# Patient Record
Sex: MALE | Race: WHITE | Employment: OTHER | URBAN - METROPOLITAN AREA
[De-identification: names, ages, dates, MRNs, and addresses within clinical notes are randomized per-mention and may not be internally consistent; named-entity substitution may affect disease eponyms.]

---

## 2017-03-13 RX ORDER — GABAPENTIN 300 MG/1
CAPSULE ORAL
Qty: 120 CAP | Refills: 0 | Status: SHIPPED | OUTPATIENT
Start: 2017-03-13 | End: 2018-01-29 | Stop reason: SDUPTHER

## 2017-03-13 NOTE — TELEPHONE ENCOUNTER
Last Visit: 09/12/2016 with MARISSA Reynoso    Next Appointment: 04/07/2017 with MD Mariah Mejia   Previous Refill Encounters: 12/30/2016 per NP Varsha Fair #[phone]     Requested Prescriptions     Pending Prescriptions Disp Refills    gabapentin (NEURONTIN) 300 mg capsule 120 Cap 0     Sig: Take 2 tabs in the am, 1 tab at noon, 1 tab at bedtime.

## 2017-04-07 ENCOUNTER — OFFICE VISIT (OUTPATIENT)
Dept: ORTHOPEDIC SURGERY | Age: 77
End: 2017-04-07

## 2017-04-07 VITALS
DIASTOLIC BLOOD PRESSURE: 64 MMHG | HEIGHT: 68 IN | RESPIRATION RATE: 20 BRPM | HEART RATE: 68 BPM | BODY MASS INDEX: 34.89 KG/M2 | WEIGHT: 230.2 LBS | SYSTOLIC BLOOD PRESSURE: 167 MMHG

## 2017-04-07 DIAGNOSIS — M54.16 LUMBAR NEURITIS: Primary | ICD-10-CM

## 2017-04-07 DIAGNOSIS — M51.36 DDD (DEGENERATIVE DISC DISEASE), LUMBAR: ICD-10-CM

## 2017-04-07 DIAGNOSIS — M43.07 LUMBOSACRAL SPONDYLOLYSIS: ICD-10-CM

## 2017-04-07 DIAGNOSIS — M43.10 SPONDYLOLISTHESIS, ACQUIRED: ICD-10-CM

## 2017-04-07 RX ORDER — GABAPENTIN 600 MG/1
TABLET ORAL
Qty: 270 TAB | Refills: 0 | Status: SHIPPED | OUTPATIENT
Start: 2017-04-07 | End: 2017-04-17 | Stop reason: SDUPTHER

## 2017-04-07 RX ORDER — ALBUTEROL SULFATE 90 UG/1
AEROSOL, METERED RESPIRATORY (INHALATION)
Refills: 11 | COMMUNITY
Start: 2017-03-21 | End: 2022-01-01

## 2017-04-07 RX ORDER — GABAPENTIN 600 MG/1
600 TABLET ORAL 3 TIMES DAILY
Qty: 90 TAB | Refills: 1 | Status: SHIPPED | OUTPATIENT
Start: 2017-04-07 | End: 2017-04-07 | Stop reason: SDUPTHER

## 2017-04-07 RX ORDER — BUDESONIDE AND FORMOTEROL FUMARATE DIHYDRATE 160; 4.5 UG/1; UG/1
AEROSOL RESPIRATORY (INHALATION)
Refills: 3 | COMMUNITY
Start: 2017-03-22 | End: 2021-01-01 | Stop reason: SDUPTHER

## 2017-04-07 RX ORDER — TRAZODONE HYDROCHLORIDE 100 MG/1
TABLET ORAL
Refills: 0 | COMMUNITY
Start: 2017-03-22 | End: 2021-07-22

## 2017-04-07 NOTE — MR AVS SNAPSHOT
Visit Information Date & Time Provider Department Dept. Phone Encounter #  
 4/7/2017  9:50 AM Wolf Perales  [facility], [address] [facility] [place] [phone] [id number] Follow-up Instructions Return in about 4 weeks (around 5/5/2017). Upcoming Health Maintenance Date Due  
 FOOT EXAM Q1 3/28/1950 MICROALBUMIN Q1 3/28/1950 EYE EXAM RETINAL OR DILATED Q1 3/28/1952 DTaP/Tdap/Td series (1 - Tdap) 3/28/1963 ZOSTER VACCINE AGE 60> 3/28/2002 GLAUCOMA SCREENING Q2Y 3/28/2007 MEDICARE YEARLY EXAM 3/28/2007 LIPID PANEL Q1 7/20/2011 HEMOGLOBIN A1C Q6M 6/16/2012 Pneumococcal 65+ Low/Medium Risk (2 of 2 - PPSV23) 7/24/2014 INFLUENZA AGE 9 TO ADULT 8/1/2016 COLONOSCOPY 1/1/2019 Allergies as of 4/7/2017  Review Complete On: 4/7/2017 By: Wolf Perales MD  
 No Known Allergies Current Immunizations  Never Reviewed Name Date Influenza Vaccine Whole 12/16/2011 Pneumococcal Vaccine (Unspecified Type) 7/24/2009 Not reviewed this visit You Were Diagnosed With   
  
 Codes Comments Lumbar neuritis    -  Primary ICD-10-CM: M54.16 
ICD-9-CM: 724.4 DDD (degenerative disc disease), lumbar     ICD-10-CM: M51.36 
ICD-9-CM: 722.52 Spondylolisthesis, acquired     ICD-10-CM: M43.10 ICD-9-CM: 738.4 Lumbosacral spondylolysis     ICD-10-CM: M43.07 
ICD-9-CM: 738.4 Vitals BP Pulse Resp Height(growth percentile) Weight(growth percentile) BMI  
 167/64 (BP 1 Location: Left arm, BP Patient Position: Sitting) 68 20 5' 8\" (1.727 m) 230 lb 3.2 oz (104.4 kg) 35 kg/m2 Smoking Status Current Every Day Smoker Vitals History BMI and BSA Data Body Mass Index Body Surface Area 35 kg/m 2 2.24 m 2 Preferred Pharmacy Pharmacy Name Phone 417 [facility], [address] [place] [address] [phone] Your Updated Medication List  
  
   
This list is accurate as of: 4/7/17 10:38 AM.  Always use your most recent med list.  
  
  
  
  
 aspirin 325 mg tablet Commonly known as:  ASPIRIN Take 81 mg by mouth daily. cetirizine 10 mg tablet Commonly known as:  ZYRTEC  
  
 esomeprazole 40 mg capsule Commonly known as:  NEXIUM  
  
 * gabapentin 300 mg capsule Commonly known as:  NEURONTIN Take 2 tabs in the am, 1 tab at noon, 1 tab at bedtime. * gabapentin 600 mg tablet Commonly known as:  NEURONTIN Take 1 Tab by mouth three (3) times daily. glipiZIDE SR 5 mg CR tablet Commonly known as:  GLUCOTROL XL  
  
 insulin lispro 100 unit/mL injection Commonly known as:  HUMALOG  
[id number] (RxNorm) LASIX 20 mg tablet Generic drug:  furosemide Take  by mouth daily. lisinopril 10 mg tablet Commonly known as:  Dorette Gabriel Take  by mouth daily. losartan 100 mg tablet Commonly known as:  COZAAR Take 50 mg by mouth daily. pioglitazone 15 mg tablet Commonly known as:  ACTOS  
  
 pravastatin 40 mg tablet Commonly known as:  PRAVACHOL 40 mg. SPIRIVA WITH HANDIHALER 18 mcg inhalation capsule Generic drug:  tiotropium SYMBICORT 160-4.5 mcg/actuation HFA inhaler Generic drug:  budesonide-formoterol INL 2 PFS PO TWICE DAILY. RINSE MOUTH AFTER U  
  
 traZODone 100 mg tablet Commonly known as:  DESYREL  
TK 1 T PO QD HS  
  
 VENTOLIN HFA 90 mcg/actuation inhaler Generic drug:  albuterol INL 1 TO 2 PFS PO Q 4 H FOR 6 H PRF SOB OR WHZ OR COUGH  
  
 VIAGRA 100 mg tablet Generic drug:  sildenafil citrate Take 100 mg by mouth as needed. VIRT-KAILYN 2.5-25-1 mg tablet Generic drug:  folic acid-vit T7-QVT B69 * Notice: This list has 2 medication(s) that are the same as other medications prescribed for you. Read the directions carefully, and ask your doctor or other care provider to review them with you. Prescriptions Sent to Pharmacy Refills  
 gabapentin (NEURONTIN) 600 mg tablet 1 Sig: Take 1 Tab by mouth three (3) times daily. Class: Normal  
 Pharmacy: [facility] [address] [facility] #: [phone] Route: Oral  
  
Follow-up Instructions Return in about 4 weeks (around 5/5/2017). Introducing [facility]! Teresa Mcneal introduces SchoolOut patient portal. Now you can access parts of your medical record, email your doctor's office, and request medication refills online. 1. In your internet browser, go to [email or web address] 2. Click on the First Time User? Click Here link in the Sign In box. You will see the New Member Sign Up page. 3. Enter your SchoolOut Access Code exactly as it appears below. You will not need to use this code after youve completed the sign-up process. If you do not sign up before the expiration date, you must request a new code. · SchoolOut Access Code: Maximiliano Solo Expires: 7/6/2017  9:41 AM 
 
4. Enter the last four digits of your Social Security Number (xxxx) and Date of Birth (mm/dd/yyyy) as indicated and click Submit. You will be taken to the next sign-up page. 5. Create a SchoolOut ID. This will be your SchoolOut login ID and cannot be changed, so think of one that is secure and easy to remember. 6. Create a SchoolOut password. You can change your password at any time. 7. Enter your Password Reset Question and Answer. This can be used at a later time if you forget your password. 8. Enter your e-mail address. You will receive e-mail notification when new information is available in [address] 9. Click Sign Up. You can now view and download portions of your medical record. 10. Click the Download Summary menu link to download a portable copy of your medical information.  
 
If you have questions, please visit the Frequently Asked Questions section of the Spark[employer]. Remember, [employer] is NOT to be used for urgent needs. For medical emergencies, dial 911. Now available from your iPhone and Android! Please provide this summary of care documentation to your next provider. Your primary care clinician is listed as [facility]. If you have any questions after today's visit, please call [phone].

## 2017-04-07 NOTE — PROGRESS NOTES
[facility]  [address]  [address], [address]   Phone: [phone]  Fax: [phone]        PROGRESS NOTE      HISTORY OF PRESENT ILLNESS:  The patient is a 78 y.o. male and was seen today for follow up of low back pain into posterior BLE to the knee, with equal severity in LE. He describes symptoms consistent with stenosis. Notes from Dr. Jeremy Palmer dated 5/3/16 indicates he has joint pain as well as back pain that is not radiating. According to the note, he has increased pain with walking. He has been treated with muscle relaxants, Tramadol, and nonsteroidal antiinflammatories without relief. Patient reports he went to therapy without relief. He continues with his HEP daily. He denies previous blocks or surgery. He states Medrol Dosepak provided no relief. He reports following Medrol Dosepak, blood sugars jim slightly above 200. He tolerates Neurontin 100 mg with slight relief. Patient reports his renal function is doing well. The patient has a history of DM and reports blood sugars are well controlled, consistently remaining around 150. She is followed by Leopoldo Sands NP for his DM. Patient denies change in bowel or bladder habits. Preliminary reading of lumbar plain films revealed limited visibility of L5-S1. Otherwise age-appropriate degenerative changes. No acute pathology identified. Lumbar spine MRI films and report dated 06/14/16 were reviewed. Per report, multilevel degenerative disc and facet joint disease. No high-grade central canal stenosis. Bilateral L5 pars defect and grade 1 anterolisthesis of L5/S1. Mild discogenic bone marrow edema signal at L2/3 suggesting inflammation. Multilevel mild to moderate degrees of foraminal stenosis as discussed. Incidentally noted aortic atherosclerosis and focal fusiform 3.2 cm aneurysmal dilation of the distal aorta.  At his last clinical appointment, I had patient follow up with Leopoldo Sands NP for the aortic aneurysms noted on lumbar spine MRI. I increased his Neurontin to 300 mg TID. Patient should continue with his HEP daily. The patient returns today with pain location and distribution remain unchanged. He rates pain 2-9/10, elevated since her last visit (5/10). Note from Samara Daley NP dated 9/12/16 indicating patient was seen for f/u of low back pain which does radiate. Pt is being monitored by his PCP for his aortic aneurysms. Pt is completing his HEP 2x/day. He is currently taking Neurontin 600 mg in the AM, 300 mg in the afternoon and 300 mg qhs with mild relief.  reviewed. 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 Social History Main Topics    Smoking status: Current Every Day Smoker     Packs/day: 1.00     Years: 56.00    Smokeless tobacco: Never Used      Comment: he smokes a few per day, although he used to smoke one pack a day regularly    Alcohol use No    Drug use: Not on file    Sexual activity: Not on file     Other Topics Concern    Not on file     Social History Narrative       Current Outpatient Prescriptions   Medication Sig Dispense Refill    traZODone (DESYREL) 100 mg tablet TK 1 T PO QD HS  0    SYMBICORT 160-4.5 mcg/actuation HFA inhaler INL 2 PFS PO TWICE DAILY. RINSE MOUTH AFTER U  3    VENTOLIN HFA 90 mcg/actuation inhaler INL 1 TO 2 PFS PO Q 4 H FOR 6 H PRF SOB OR WHZ OR COUGH  11    gabapentin (NEURONTIN) 600 mg tablet Take 1 Tab by mouth three (3) times daily.  90 Tab 1    gabapentin (NEURONTIN) 300 mg capsule Take 2 tabs in the am, 1 tab at noon, 1 tab at bedtime. 120 Cap 0    SPIRIVA WITH HANDIHALER 18 mcg inhalation capsule       VIRT-KAILYN 2.5-25-1 mg tablet       pravastatin (PRAVACHOL) 40 mg tablet 40 mg.      glipiZIDE SR (GLUCOTROL) 5 mg CR tablet   0    esomeprazole (NEXIUM) 40 mg capsule   6    pioglitazone (ACTOS) 15 mg tablet   0    losartan (COZAAR) 100 mg tablet Take 50 mg by mouth daily.  furosemide (LASIX) 20 mg tablet Take  by mouth daily.  lisinopril (PRINIVIL, ZESTRIL) 10 mg tablet Take  by mouth daily.  sildenafil citrate (VIAGRA) 100 mg tablet Take 100 mg by mouth as needed.  aspirin (ASPIRIN) 325 mg tablet Take 81 mg by mouth daily.  insulin lispro (HUMALOG) 100 unit/mL injection [id number] (RxNorm)      cetirizine (ZYRTEC) 10 mg tablet   1       No Known Allergies       PHYSICAL EXAMINATION    Visit Vitals    /64 (BP 1 Location: Left arm, BP Patient Position: Sitting)  Comment: pt asymptomatic, just took bp meds    Pulse 68    Resp 20    Ht 5' 8\" (1.727 m)    Wt 230 lb 3.2 oz (104.4 kg)    BMI 35 kg/m2       CONSTITUTIONAL: NAD, A&O x 3  SENSATION: Intact to light touch throughout  RANGE OF MOTION: The patient has full passive range of motion in all four extremities. MOTOR:  Straight Leg Raise: Negative, bilateral               Hip Flex Knee Ext Knee Flex Ankle DF GTE Ankle PF Tone   Right +4/5 +4/5 +4/5 +4/5 +4/5 +4/5 +4/5   Left +4/5 +4/5 +4/5 +4/5 +4/5 +4/5 +4/5       ASSESSMENT   Brit Galvan was seen today for back pain and leg pain. Diagnoses and all orders for this visit:    Lumbar neuritis    DDD (degenerative disc disease), lumbar    Spondylolisthesis, acquired    Lumbosacral spondylolysis    Other orders  -     gabapentin (NEURONTIN) 600 mg tablet; Take 1 Tab by mouth three (3) times daily. IMPRESSION AND PLAN:   I will increase his Neurontin to 600 mg TID. Patient advised to call the office if intolerant to higher dose. The patient would like to defer lumbar blocks at this time. I encourage patient to perform his HEP daily. I will see the patient back in 1 month's time or earlier if needed. Written by Jhoana Hensley, as dictated by Charlie Humphries MD  I examined the patient, reviewed and agree with the note.

## 2017-04-17 RX ORDER — GABAPENTIN 600 MG/1
600 TABLET ORAL 3 TIMES DAILY
Qty: 270 TAB | Refills: 0 | Status: SHIPPED | OUTPATIENT
Start: 2017-04-17 | End: 2017-08-23 | Stop reason: SDUPTHER

## 2017-04-17 RX ORDER — GABAPENTIN 300 MG/1
CAPSULE ORAL
Qty: 360 CAP | Refills: 0 | OUTPATIENT
Start: 2017-04-17

## 2017-04-17 NOTE — TELEPHONE ENCOUNTER
IMPRESSION AND PLAN:  I will increase his Neurontin to 600 mg TID. Patient advised to call the office if intolerant to higher dose. .. I will see the patient back in 1 month's time or earlier if needed. Dr. Juan Sousa prescribed Neurontin RX with dosage increase during the LOV. The prescription has been received and filled by the pharmacy for a 30 day supply, he has 1 refill remaining. The pharmacy did not receive the prescription for a 90 day supply. Pending order for 90 day supply to resend to pharmacy.

## 2017-06-21 ENCOUNTER — OFFICE VISIT (OUTPATIENT)
Dept: ORTHOPEDIC SURGERY | Age: 77
End: 2017-06-21

## 2017-06-21 VITALS
WEIGHT: 222 LBS | DIASTOLIC BLOOD PRESSURE: 55 MMHG | RESPIRATION RATE: 18 BRPM | HEIGHT: 68 IN | HEART RATE: 60 BPM | BODY MASS INDEX: 33.65 KG/M2 | SYSTOLIC BLOOD PRESSURE: 151 MMHG

## 2017-06-21 DIAGNOSIS — M51.36 DDD (DEGENERATIVE DISC DISEASE), LUMBAR: ICD-10-CM

## 2017-06-21 DIAGNOSIS — M47.26 OSTEOARTHRITIS OF SPINE WITH RADICULOPATHY, LUMBAR REGION: Primary | ICD-10-CM

## 2017-06-21 NOTE — PATIENT INSTRUCTIONS
Back Pain: Care Instructions  Your Care Instructions    Back pain has many possible causes. It is often related to problems with muscles and ligaments of the back. It may also be related to problems with the nerves, discs, or bones of the back. Moving, lifting, standing, sitting, or sleeping in an awkward way can strain the back. Sometimes you don't notice the injury until later. Arthritis is another common cause of back pain. Although it may hurt a lot, back pain usually improves on its own within several weeks. Most people recover in 12 weeks or less. Using good home treatment and being careful not to stress your back can help you feel better sooner. Follow-up care is a key part of your treatment and safety. Be sure to make and go to all appointments, and call your doctor if you are having problems. Its also a good idea to know your test results and keep a list of the medicines you take. How can you care for yourself at home? · Sit or lie in positions that are most comfortable and reduce your pain. Try one of these positions when you lie down:  ¨ Lie on your back with your knees bent and supported by large pillows. ¨ Lie on the floor with your legs on the seat of a sofa or chair. Gerardo Lipschutz on your side with your knees and hips bent and a pillow between your legs. ¨ Lie on your stomach if it does not make pain worse. · Do not sit up in bed, and avoid soft couches and twisted positions. Bed rest can help relieve pain at first, but it delays healing. Avoid bed rest after the first day of back pain. · Change positions every 30 minutes. If you must sit for long periods of time, take breaks from sitting. Get up and walk around, or lie in a comfortable position. · Try using a heating pad on a low or medium setting for 15 to 20 minutes every 2 or 3 hours. Try a warm shower in place of one session with the heating pad. · You can also try an ice pack for 10 to 15 minutes every 2 to 3 hours.  Put a thin cloth between the ice pack and your skin. · Take pain medicines exactly as directed. ¨ If the doctor gave you a prescription medicine for pain, take it as prescribed. ¨ If you are not taking a prescription pain medicine, ask your doctor if you can take an over-the-counter medicine. · Take short walks several times a day. You can start with 5 to 10 minutes, 3 or 4 times a day, and work up to longer walks. Walk on level surfaces and avoid hills and stairs until your back is better. · Return to work and other activities as soon as you can. Continued rest without activity is usually not good for your back. · To prevent future back pain, do exercises to stretch and strengthen your back and stomach. Learn how to use good posture, safe lifting techniques, and proper body mechanics. When should you call for help? Call your doctor now or seek immediate medical care if:  · You have new or worsening numbness in your legs. · You have new or worsening weakness in your legs. (This could make it hard to stand up.)  · You lose control of your bladder or bowels. Watch closely for changes in your health, and be sure to contact your doctor if:  · Your pain gets worse. · You are not getting better after 2 weeks. Where can you learn more? Go to http://marilu-soren.info/. Enter I759 in the search box to learn more about \"Back Pain: Care Instructions. \"  Current as of: March 21, 2017  Content Version: 11.3  © 7632-2718 [employer]. Care instructions adapted under license by [employer] (which disclaims liability or warranty for this information). If you have questions about a medical condition or this instruction, always ask your healthcare professional. [address] any warranty or liability for your use of this information.

## 2017-06-21 NOTE — PROGRESS NOTES
Chief complaint/History of Present Illness:  Chief Complaint   Patient presents with    Back Pain     Follow up     Leg Pain     BLE     MARY Carlos is a  78 y.o.  male      HISTORY OF PRESENT ILLNESS:  The patient comes in today for follow-up of his low back pain with bilateral lower extremity pain. Neurontin 600 mg three times per day is helping his leg pain, but he is still having back pain. The back pain seems to be worse. He rates it as 7/10. He no longer takes Tramadol through his PCP because he is on Trazodone. They do not want him to take it together. His blood sugar was 118 when he checked it this morning. He denies fever, bowel, or bladder dysfunction. PHYSICAL EXAM:  Mr. Senait Dougherty  is a 81-year-old male. He is alert and oriented and has a full weightbearing and non-antalgic gait. He has pain with hyperextension of the lumbar spine. His pain seems to be more in the upper lumbar, L3-L4 and L4-L5 than where his anterolisthesis is at L5-S1. He has 4/5 strength in bilateral lower extremities. Negative straight leg raise. He has pain with hyperextension of the lumbar spine. ASSESSENT/PLAN:  This is a patient with lumbar spondylosis, facet hypertrophy at L3-L4 and L4-L5. We are going to set him up for bilateral L3-L4 and L4-L5 facet blocks. He has enough Neurontin. We will see him back after the block.          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 Cecil Glass at [facility]      s/p right knee     Social History     Social History    Marital status:      Spouse name: N/A    Number of children: N/A    Years of education: N/A     Occupational History    Not on file. Social History Main Topics    Smoking status: Current Every Day Smoker     Packs/day: 1.00     Years: 56.00    Smokeless tobacco: Never Used      Comment: he smokes a few per day, although he used to smoke one pack a day regularly    Alcohol use No    Drug use: Not on file    Sexual activity: Not on file     Other Topics Concern    Not on file     Social History Narrative     Family History   Problem Relation Age of Onset    Diabetes Sister     No Known Problems Brother     No Known Problems Brother     Diabetes Sister        Physical Exam:  Visit Vitals    /55    Pulse 60    Resp 18    Ht 5' 8\" (1.727 m)    Wt 222 lb (100.7 kg)    BMI 33.75 kg/m2     Pain Scale: 7/10     has been . reviewed and is appropriate        Jonas Cervantes was seen today for back pain and leg pain. Diagnoses and all orders for this visit:    Osteoarthritis of spine with radiculopathy, lumbar region  -     SCHEDULE SURGERY    DDD (degenerative disc disease), lumbar            Follow-up Disposition:  Return for after block.         We have informed Iris Dalal to notify us for immediate appointment if he has any worsening neurogical symptoms or if an emergency situation presents, then call [phone]

## 2017-08-23 ENCOUNTER — TELEPHONE (OUTPATIENT)
Dept: ORTHOPEDIC SURGERY | Age: 77
End: 2017-08-23

## 2017-08-23 RX ORDER — GABAPENTIN 600 MG/1
600 TABLET ORAL 3 TIMES DAILY
Qty: 90 TAB | Refills: 0 | Status: SHIPPED | OUTPATIENT
Start: 2017-08-23 | End: 2017-08-23 | Stop reason: SDUPTHER

## 2017-08-23 RX ORDER — GABAPENTIN 600 MG/1
TABLET ORAL
Qty: 270 TAB | Refills: 0 | Status: SHIPPED | OUTPATIENT
Start: 2017-08-23 | End: 2021-07-22

## 2017-08-23 NOTE — TELEPHONE ENCOUNTER
Pt called in for a refill on Gabapentin. Pt has a follow up appointment on 9/13 with Dr. Eusebio Kearney but is out of medication. Pt can be reached at [phone].

## 2017-08-23 NOTE — TELEPHONE ENCOUNTER
One month rx given.  Please explain one month was given to get him to his FU in September at which time we can send a 90 day supply

## 2017-08-23 NOTE — TELEPHONE ENCOUNTER
Called and informed patient that per the provider we have sent a 30 day supply of his medication to get him to his follow up appointment and then he will be on track. Patient then states he would like a motorized scooter and it wanted to make sure we have the information when he gets here. I informed patient I will discuss this with the provider and what information we find we could give him at his next appointment. Patient verbalized understanding. Please advise about the motorized scooter.

## 2017-08-24 NOTE — TELEPHONE ENCOUNTER
This would be a decision Dr. Britt Van makes. In a recent meeting with Thana Kanner, we discussed trying not to prescribe motorized scooters as they decrease the patients mobility.

## 2018-01-29 ENCOUNTER — OFFICE VISIT (OUTPATIENT)
Dept: ORTHOPEDIC SURGERY | Age: 78
End: 2018-01-29

## 2018-01-29 VITALS
HEART RATE: 62 BPM | SYSTOLIC BLOOD PRESSURE: 128 MMHG | WEIGHT: 208 LBS | BODY MASS INDEX: 31.52 KG/M2 | DIASTOLIC BLOOD PRESSURE: 48 MMHG | HEIGHT: 68 IN

## 2018-01-29 DIAGNOSIS — M47.16 LUMBAR SPONDYLOSIS WITH MYELOPATHY: Primary | ICD-10-CM

## 2018-01-29 RX ORDER — GABAPENTIN 600 MG/1
600 TABLET ORAL 3 TIMES DAILY
Qty: 90 TAB | Refills: 0 | Status: SHIPPED | OUTPATIENT
Start: 2018-01-29 | End: 2018-06-01

## 2018-01-29 NOTE — PROGRESS NOTES
[facility]  [address]  [address], [address]   Phone: [phone]  Fax: [phone]        PROGRESS NOTE      HISTORY OF PRESENT ILLNESS:  The patient is a 78 y.o. male and was seen today for follow up of low back pain into posterior BLE to the knee, with equal severity in LE. He describes symptoms consistent with stenosis. Notes from Dr. Romelia Ordaz dated 5/3/16 indicates he has joint pain as well as back pain that is not radiating. According to the note, he has increased pain with walking. He has been treated with muscle relaxants, Tramadol, and nonsteroidal antiinflammatories without relief. Note from Kamille Hernandez NP dated 9/12/16 indicating patient was seen for f/u of low back pain which does radiate. Patient reports he went to therapy without relief. He continues with his HEP daily. He denies previous blocks or surgery. He states Medrol Dosepak provided no relief. He reports following Medrol Dosepak, blood sugars jim slightly above 200. He tolerates Neurontin 100 mg with slight relief. Patient reports his renal function is doing well. Pt is being monitored by his PCP for his aortic aneurysms. The patient has a history of DM and reports blood sugars are well controlled, consistently remaining around 150. She is followed by Bessie Damian NP for his DM. Patient denies change in bowel or bladder habits. Preliminary reading of lumbar plain films revealed limited visibility of L5-S1. Otherwise age-appropriate degenerative changes. No acute pathology identified. Lumbar spine MRI films and report dated 06/14/16 were reviewed. Per report, multilevel degenerative disc and facet joint disease. No high-grade central canal stenosis. Bilateral L5 pars defect and grade 1 anterolisthesis of L5/S1. Mild discogenic bone marrow edema signal at L2/3 suggesting inflammation. Multilevel mild to moderate degrees of foraminal stenosis as discussed.  Incidentally noted aortic atherosclerosis and focal fusiform 3.2 cm aneurysmal dilation of the distal aorta. At his last clinical appointment, I increased his Neurontin to 600 mg TID. The patient would like to defer lumbar blocks at this time. I encourage patient to perform his HEP daily. Last seen by me on 4/7/17 for low back pain extending into the BLE. Symptoms consistent for stenosis. At that time  I increased his Neurontin to 600 mg TID. He was supposed to f/u within one month's time but he failed to do so. He subsequently saw Summer Malave on 6/21/17. Note reviewed. It was noted; patient presented with an improvement in his extremity pain but continued to experience low back pain despite the increased of Neurontin. He rates 7/10. She set him up for a bilateral L3-L4 and L4-L5 facet blocks, which he received on 6/27/17 with 3-4 month benefit. Patient failed to follow up after block. The patient returns today with pain location and distribution remain unchanged. He rates pain 5/10, consistent with his last visit (2-9/10). Patient run out of Neurontin 600 mg TID last week as he was really taking it once a day without any increase in pain.  reviewed. Body mass index is 31.63 kg/(m^2). 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      Social History Main Topics    Smoking status: Current Every Day Smoker     Packs/day: 1.00     Years: 56.00    Smokeless tobacco: Never Used      Comment: he smokes a few per day, although he used to smoke one pack a day regularly    Alcohol use No    Drug use: Not on file    Sexual activity: Not on file     Other Topics Concern    Not on file     Social History Narrative       Current Outpatient Prescriptions   Medication Sig Dispense Refill    gabapentin (NEURONTIN) 600 mg tablet Take 1 Tab by mouth three (3) times daily. 90 Tab 0    gabapentin (NEURONTIN) 600 mg tablet TAKE 1 TABLET BY MOUTH THREE TIMES DAILY 270 Tab 0    traZODone (DESYREL) 100 mg tablet TK 1 T PO QD HS  0    SYMBICORT 160-4.5 mcg/actuation HFA inhaler INL 2 PFS PO TWICE DAILY. RINSE MOUTH AFTER U  3    VENTOLIN HFA 90 mcg/actuation inhaler INL 1 TO 2 PFS PO Q 4 H FOR 6 H PRF SOB OR WHZ OR COUGH  11    insulin lispro (HUMALOG) 100 unit/mL injection [id number] (RxNorm)      SPIRIVA WITH HANDIHALER 18 mcg inhalation capsule       VIRT-KAILYN 2.5-25-1 mg tablet       pravastatin (PRAVACHOL) 40 mg tablet 40 mg.      glipiZIDE SR (GLUCOTROL) 5 mg CR tablet   0    esomeprazole (NEXIUM) 40 mg capsule   6    losartan (COZAAR) 100 mg tablet Take 50 mg by mouth daily.  furosemide (LASIX) 20 mg tablet Take  by mouth daily.  sildenafil citrate (VIAGRA) 100 mg tablet Take 100 mg by mouth as needed.  aspirin (ASPIRIN) 325 mg tablet Take 81 mg by mouth daily.  pioglitazone (ACTOS) 15 mg tablet   0    lisinopril (PRINIVIL, ZESTRIL) 10 mg tablet Take  by mouth daily. No Known Allergies       PHYSICAL EXAMINATION    Visit Vitals    /48    Pulse 62    Ht 5' 8\" (1.727 m)    Wt 208 lb (94.3 kg)    BMI 31.63 kg/m2       CONSTITUTIONAL: NAD, A&O x 3  SENSATION: Intact to light touch throughout  RANGE OF MOTION: The patient has full passive range of motion in all four extremities. MOTOR:  Straight Leg Raise: Negative, bilateral               Hip Flex Knee Ext Knee Flex Ankle DF GTE Ankle PF Tone   Right +4/5 +4/5 +4/5 +4/5 +4/5 +4/5 +4/5   Left +4/5 +4/5 +4/5 +4/5 +4/5 +4/5 +4/5       ASSESSMENT   Diagnoses and all orders for this visit:    1.  Lumbar spondylosis with myelopathy  -     REFERRAL TO PHYSICAL THERAPY    Other orders  -     gabapentin (NEURONTIN) 600 mg tablet; Take 1 Tab by mouth three (3) times daily. IMPRESSION AND PLAN:  Patient deferred further blocks at this time. I will refill his Neurontin 600 mg once a day. I will refer him to physical therapy with an emphasis on HEP. I will see the patient back in 4 month's time or earlier if needed. Written by Symone Mathur, as dictated by Phill Hernandez MD  I examined the patient, reviewed and agree with the note.

## 2018-01-29 NOTE — MR AVS SNAPSHOT
[address] [facility] 
 
 
 [address] [facility] 
[phone] Patient: Camacho Andres MRN: [id number]:[id number] Visit Information Date & Time Provider Department Dept. Phone Encounter #  
 1/29/2018 10:15 AM Marty Fletcher MD [facility], [address] [facility] [place] [phone] [id number] Follow-up Instructions Return in about 4 weeks (around 2/26/2018). Upcoming Health Maintenance Date Due  
 FOOT EXAM Q1 3/28/1950 MICROALBUMIN Q1 3/28/1950 EYE EXAM RETINAL OR DILATED Q1 3/28/1952 DTaP/Tdap/Td series (1 - Tdap) 3/28/1963 ZOSTER VACCINE AGE 60> 1/28/2002 GLAUCOMA SCREENING Q2Y 3/28/2007 MEDICARE YEARLY EXAM 3/28/2007 LIPID PANEL Q1 7/20/2011 HEMOGLOBIN A1C Q6M 6/16/2012 Pneumococcal 65+ Low/Medium Risk (2 of 2 - PPSV23) 7/24/2014 Influenza Age 5 to Adult 8/1/2017 COLONOSCOPY 1/1/2019 Allergies as of 1/29/2018  Review Complete On: 1/29/2018 By: Marty Fletcher MD  
 No Known Allergies Current Immunizations  Never Reviewed Name Date Influenza Vaccine Whole 12/16/2011 ZZZ-RETIRED (DO NOT USE) Pneumococcal Vaccine (Unspecified Type) 7/24/2009 Not reviewed this visit You Were Diagnosed With   
  
 Codes Comments Lumbar spondylosis with myelopathy    -  Primary ICD-10-CM: M47.16 
ICD-9-CM: 721.42 Vitals BP Pulse Height(growth percentile) Weight(growth percentile) BMI Smoking Status 128/48 62 5' 8\" (1.727 m) 208 lb (94.3 kg) 31.63 kg/m2 Current Every Day Smoker Vitals History BMI and BSA Data Body Mass Index Body Surface Area  
 31.63 kg/m 2 2.13 m 2 Preferred Pharmacy Pharmacy Name Phone [facility], [address] [place] [address] [phone] Your Updated Medication List  
  
   
This list is accurate as of: 1/29/18 11:40 AM.  Always use your most recent med list.  
  
  
 aspirin 325 mg tablet Commonly known as:  ASPIRIN Take 81 mg by mouth daily. esomeprazole 40 mg capsule Commonly known as:  NEXIUM  
  
 * gabapentin 600 mg tablet Commonly known as:  NEURONTIN  
TAKE 1 TABLET BY MOUTH THREE TIMES DAILY * gabapentin 600 mg tablet Commonly known as:  NEURONTIN Take 1 Tab by mouth three (3) times daily. glipiZIDE SR 5 mg CR tablet Commonly known as:  GLUCOTROL XL  
  
 insulin lispro 100 unit/mL injection Commonly known as:  HUMALOG  
[id number] (RxNorm) LASIX 20 mg tablet Generic drug:  furosemide Take  by mouth daily. lisinopril 10 mg tablet Commonly known as:  Cool Blue Take  by mouth daily. losartan 100 mg tablet Commonly known as:  COZAAR Take 50 mg by mouth daily. pioglitazone 15 mg tablet Commonly known as:  ACTOS  
  
 pravastatin 40 mg tablet Commonly known as:  PRAVACHOL 40 mg. SPIRIVA WITH HANDIHALER 18 mcg inhalation capsule Generic drug:  tiotropium SYMBICORT 160-4.5 mcg/actuation Hfaa Generic drug:  budesonide-formoterol INL 2 PFS PO TWICE DAILY. RINSE MOUTH AFTER U  
  
 traZODone 100 mg tablet Commonly known as:  DESYREL  
TK 1 T PO QD HS  
  
 VENTOLIN HFA 90 mcg/actuation inhaler Generic drug:  albuterol INL 1 TO 2 PFS PO Q 4 H FOR 6 H PRF SOB OR WHZ OR COUGH  
  
 VIAGRA 100 mg tablet Generic drug:  sildenafil citrate Take 100 mg by mouth as needed. VIRT-KAILYN 2.5-25-1 mg tablet Generic drug:  folic acid-vit I8-CBN P61 * Notice: This list has 2 medication(s) that are the same as other medications prescribed for you. Read the directions carefully, and ask your doctor or other care provider to review them with you. Prescriptions Sent to Pharmacy Refills  
 gabapentin (NEURONTIN) 600 mg tablet 0 Sig: Take 1 Tab by mouth three (3) times daily.   
 Class: Normal  
 Pharmacy: [facility] [address] [facility] #: [phone] Route: Oral  
  
We Performed the Following REFERRAL TO PHYSICAL THERAPY [API55 Custom] Comments:  
 DX:LBP HEP 
LOCATION:[facility] 2-3 visits/ 2-3 weeks Follow-up Instructions Return in about 4 weeks (around 2/26/2018). Referral Information Referral ID Referred By Referred To  
  
 [id number] JAMIL ESPINOZA III Not Available Visits Status Start Date End Date 1 New Request 1/29/18 1/29/19 If your referral has a status of pending review or denied, additional information will be sent to support the outcome of this decision. Introducing [facility]! [facility] introduces [employer] patient portal. Now you can access parts of your medical record, email your doctor's office, and request medication refills online. 1. In your internet browser, go to [email or web address] 2. Click on the First Time User? Click Here link in the Sign In box. You will see the New Member Sign Up page. 3. Enter your [employer] Access Code exactly as it appears below. You will not need to use this code after youve completed the sign-up process. If you do not sign up before the expiration date, you must request a new code. · [employer] Access Code: [email or web address] Expires: 4/29/2018 10:28 AM 
 
4. Enter the last four digits of your Social Security Number (xxxx) and Date of Birth (mm/dd/yyyy) as indicated and click Submit. You will be taken to the next sign-up page. 5. Create a [employer] ID. This will be your [employer] login ID and cannot be changed, so think of one that is secure and easy to remember. 6. Create a [employer] password. You can change your password at any time. 7. Enter your Password Reset Question and Answer. This can be used at a later time if you forget your password. 8. Enter your e-mail address. You will receive e-mail notification when new information is available in [address] 9. Click Sign Up. You can now view and download portions of your medical record. 10. Click the Download Summary menu link to download a portable copy of your medical information. If you have questions, please visit the Frequently Asked Questions section of the [employer] website. Remember, [employer] is NOT to be used for urgent needs. For medical emergencies, dial 911. Now available from your iPhone and Android! Please provide this summary of care documentation to your next provider. Your primary care clinician is listed as Glory Gaspar. If you have any questions after today's visit, please call [phone].

## 2019-03-11 ENCOUNTER — OFFICE VISIT (OUTPATIENT)
Dept: ORTHOPEDIC SURGERY | Age: 79
End: 2019-03-11

## 2019-03-11 ENCOUNTER — DOCUMENTATION ONLY (OUTPATIENT)
Dept: ORTHOPEDIC SURGERY | Age: 79
End: 2019-03-11

## 2019-03-11 VITALS
DIASTOLIC BLOOD PRESSURE: 66 MMHG | WEIGHT: 189 LBS | HEART RATE: 57 BPM | HEIGHT: 68 IN | OXYGEN SATURATION: 100 % | BODY MASS INDEX: 28.64 KG/M2 | SYSTOLIC BLOOD PRESSURE: 117 MMHG

## 2019-03-11 DIAGNOSIS — M54.16 LUMBAR NEURITIS: ICD-10-CM

## 2019-03-11 DIAGNOSIS — M47.16 LUMBAR SPONDYLOSIS WITH MYELOPATHY: Primary | ICD-10-CM

## 2019-03-11 RX ORDER — GABAPENTIN 600 MG/1
600 TABLET ORAL 3 TIMES DAILY
Qty: 90 TAB | Refills: 1 | Status: SHIPPED | OUTPATIENT
Start: 2019-03-11 | End: 2021-07-22

## 2019-03-11 RX ORDER — DIAZEPAM 10 MG/1
TABLET ORAL
Qty: 1 TAB | Refills: 0 | Status: SHIPPED | OUTPATIENT
Start: 2019-03-11 | End: 2021-07-22

## 2019-03-11 NOTE — PROGRESS NOTES
MRI of the Lumbar Spine without contrast is scheduled at [facility], [phone], on 03/23/19, arrive 8:45AM, test 9:15AM. No [facility] FEP pre-authorization required.

## 2019-03-11 NOTE — PROGRESS NOTES
[facility]  [address], [address]  Phone: [phone]  Fax: [phone]        PROGRESS NOTE      HISTORY OF PRESENT ILLNESS:  The patient is a 70 y.o. male and was seen today for follow up of low back pain into posterior BLE to the knee, with equal severity in LE. He describes symptoms consistent with stenosis. Notes from Dr. Anette Mensah dated 5/3/16 indicates he has joint pain as well as back pain that is not radiating. According to the note, he has increased pain with walking. He has been treated with muscle relaxants, Tramadol, and nonsteroidal antiinflammatories without relief. Note from Briana Liz NP dated 9/12/16 indicating patient was seen for f/u of low back pain which does radiate. Patient reports he went to therapy without relief. He continues with his HEP daily. He denies previous blocks or surgery. He states Medrol Dosepak provided no relief. He reports following Medrol Dosepak, blood sugars jim slightly above 200. He tolerates Neurontin 100 mg with slight relief. Patient reports his renal function is doing well. Pt is being monitored by his PCP for his aortic aneurysms. The patient has a history of DM and reports blood sugars are well controlled, consistently remaining around 150. She is followed by Digna Gold NP for his DM. Patient denies change in bowel or bladder habits. Pt saw Levonne Cabot on 6/21/17. Note reviewed. It was noted; patient presented with an improvement in his extremity pain but continued to experience low back pain despite the increased of Neurontin. He rates 7/10. She set him up for a bilateral L3-L4 and L4-L5 facet blocks, which he received on 6/27/17 with 3-4 month benefit. Patient failed to follow up after block. Preliminary reading of lumbar plain films revealed limited visibility of L5-S1. Otherwise age-appropriate degenerative changes. No acute pathology identified.  Lumbar spine MRI films and report dated 06/14/16 were reviewed. Per report, multilevel degenerative disc and facet joint disease. No high-grade central canal stenosis. Bilateral L5 pars defect and grade 1 anterolisthesis of L5/S1. Mild discogenic bone marrow edema signal at L2/3 suggesting inflammation. Multilevel mild to moderate degrees of foraminal stenosis as discussed. Incidentally noted aortic atherosclerosis and focal fusiform 3.2 cm aneurysmal dilation of the distal aorta. At his last clinical appointment, patient deferred further blocks at this time. I refilled his Neurontin 600 mg once a day. I referred him to physical therapy with an emphasis on HEP. The patient returns today with pain location and distribution remain unchanged. He rates his pain 6/10, previously 5/10. Last seen by me 1/29/18. Pt was scheduled to f/u in 4 months but failed to do so. Pt is currently taking Neurontin 600 mg BID. Pt denies change in bowel or bladder habits.  reviewed. Body mass index is 28.74 kg/m².       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    Kidney disease     Nephrolithiasis     OA (osteoarthritis)         Social History     Socioeconomic History    Marital status:      Spouse name: Not on file    Number of children: Not on file    Years of education: Not on file    Highest education level: Not on file   Social Needs    Financial resource strain: Not on file    Food insecurity - worry: Not on file    Food insecurity - inability: Not on file   [employer] needs - medical: Not on file   [employer] needs - non-medical: Not on file   Occupational History    Not on file   Tobacco Use    Smoking status: Current Every Day Smoker     Packs/day: 1.00     Years: 56.00     Pack years: 56.00    Smokeless tobacco: Never Used    Tobacco comment: he smokes a few per day, although he used to smoke one pack a day regularly   Substance and Sexual Activity    Alcohol use: No    Drug use: Not on file    Sexual activity: Not on file   Other Topics Concern    Not on file   Social History Narrative    Not on file       Current Outpatient Medications   Medication Sig Dispense Refill    levoFLOXacin (LEVAQUIN) 500 mg tablet Take 1 Tab by mouth daily. 14 Tab 0    tamsulosin (FLOMAX) 0.4 mg capsule TAKE 1 CAPSULE BY MOUTH DAILY AFTER DINNER 90 Cap 1    levoFLOXacin (LEVAQUIN) 500 mg tablet       amLODIPine (NORVASC) 5 mg tablet TK 1 T PO QD  0    HYDROcodone-acetaminophen (NORCO) 5-325 mg per tablet TK 1 T PO Q SIX H PRN P  0    ibuprofen (MOTRIN) 600 mg tablet TK 1 T PO Q SIX H WITH FOOD  PRN P  0    methocarbamol (ROBAXIN) 750 mg tablet TK 2 TS PO TID  0    traMADol (ULTRAM) 50 mg tablet TK 1 T PO QD FOR 14 DAYS PRN  0    trimethoprim-sulfamethoxazole (BACTRIM, SEPTRA)  mg per tablet Take 1 Tab by mouth two (2) times a day. 10 Tab 0    gabapentin (NEURONTIN) 600 mg tablet TAKE 1 TABLET BY MOUTH THREE TIMES DAILY 270 Tab 0    traZODone (DESYREL) 100 mg tablet TK 1 T PO QD HS  0    SYMBICORT 160-4.5 mcg/actuation HFA inhaler INL 2 PFS PO TWICE DAILY. RINSE MOUTH AFTER U  3    VENTOLIN HFA 90 mcg/actuation inhaler INL 1 TO 2 PFS PO Q 4 H FOR 6 H PRF SOB OR WHZ OR COUGH  11    insulin lispro (HUMALOG) 100 unit/mL injection [id number] (RxNorm)      SPIRIVA WITH HANDIHALER 18 mcg inhalation capsule       VIRT-KAILYN 2.5-25-1 mg tablet       pravastatin (PRAVACHOL) 40 mg tablet 40 mg.      glipiZIDE SR (GLUCOTROL) 5 mg CR tablet   0    esomeprazole (NEXIUM) 40 mg capsule   6    pioglitazone (ACTOS) 15 mg tablet   0    losartan (COZAAR) 100 mg tablet Take 50 mg by mouth daily.       furosemide (LASIX) 20 mg tablet Take by mouth daily.  lisinopril (PRINIVIL, ZESTRIL) 10 mg tablet Take  by mouth daily.  sildenafil citrate (VIAGRA) 100 mg tablet Take 100 mg by mouth as needed.  aspirin (ASPIRIN) 325 mg tablet Take 81 mg by mouth daily. No Known Allergies       PHYSICAL EXAMINATION    Visit Vitals  /66   Pulse (!) 57   Ht 5' 8\" (1.727 m)   Wt 189 lb (85.7 kg)   SpO2 100%   BMI 28.74 kg/m²       CONSTITUTIONAL: NAD, A&O x 3  SENSATION: Intact to light touch throughout  RANGE OF MOTION: The patient has full passive range of motion in all four extremities. MOTOR:  Straight Leg Raise: Negative, bilateral               Hip Flex Knee Ext Knee Flex Ankle DF GTE Ankle PF Tone   Right +4/5 +4/5 +4/5 +4/5 +4/5 +4/5 +4/5   Left +4/5 +4/5 +4/5 +4/5 +4/5 +4/5 +4/5       ASSESSMENT   Diagnoses and all orders for this visit:    1. Lumbar spondylosis with myelopathy    2. Lumbar neuritis          IMPRESSION AND PLAN:  Patient is considering blocks at this time. I will set him up for a new MRI of the lumbar spine. I will increase his dose of Neurontin to 600 mg TID. Patient advised to call the office if intolerant to higher dose. Patient is neurologically intact. I will see the patient back following MRI. Written by Emily Obregon, as dictated by Oneyda Zarate MD  I examined the patient, reviewed and agree with the note.

## 2019-03-11 NOTE — PROGRESS NOTES
Chief Complaint   Patient presents with    Back Pain     low back pain     1. Have you been to the ER, urgent care clinic since your last visit? Hospitalized since your last visit? Yes When: [facility] in February for his hand. 2. Have you seen or consulted any other health care providers outside of the [address] [place] [address] since your last visit? Include any pap smears or colon screening.  No

## 2019-03-29 ENCOUNTER — OFFICE VISIT (OUTPATIENT)
Dept: ORTHOPEDIC SURGERY | Age: 79
End: 2019-03-29

## 2019-03-29 VITALS
TEMPERATURE: 98.3 F | HEART RATE: 70 BPM | RESPIRATION RATE: 18 BRPM | DIASTOLIC BLOOD PRESSURE: 52 MMHG | OXYGEN SATURATION: 96 % | WEIGHT: 192 LBS | SYSTOLIC BLOOD PRESSURE: 134 MMHG | HEIGHT: 68 IN | BODY MASS INDEX: 29.1 KG/M2

## 2019-03-29 DIAGNOSIS — M47.16 LUMBAR SPONDYLOSIS WITH MYELOPATHY: Primary | ICD-10-CM

## 2019-03-29 DIAGNOSIS — M54.16 LUMBAR RADICULOPATHY: ICD-10-CM

## 2019-03-29 DIAGNOSIS — M43.07 SPONDYLOLYSIS, LUMBOSACRAL: ICD-10-CM

## 2019-03-29 DIAGNOSIS — M43.10 SPONDYLOLISTHESIS, ACQUIRED: ICD-10-CM

## 2019-03-29 NOTE — PROGRESS NOTES
[facility]  [address], [address]  Phone: [phone]  Fax: [phone]        PROGRESS NOTE      HISTORY OF PRESENT ILLNESS:  The patient is a 70 y.o. male and was seen today for follow up of low back pain into posterior BLE to the knee, with equal severity in LE. He describes symptoms consistent with stenosis. Notes from Dr. Julianne Coreas dated 5/3/16 indicates he has joint pain as well as back pain that is not radiating. According to the note, he has increased pain with walking. He has been treated with muscle relaxants, Tramadol, and nonsteroidal anti-inflammatories without relief. Note from Nayana Anne NP dated 9/12/16 indicating patient was seen for f/u of low back pain which does radiate. Patient reports he went to therapy without relief. He continues with his HEP daily. He denies previous blocks or surgery. He states Medrol Dosepak provided no relief. He reports following Medrol Dosepak, blood sugars jim slightly above 200. He tolerates Neurontin 100 mg with slight relief. Patient reports his renal function is doing well. Pt is being monitored by his PCP for his aortic aneurysms. The patient has a history of DM and reports blood sugars are well controlled, consistently remaining around 150. She is followed by Jose Briseno NP for his DM. Patient denies change in bowel or bladder habits. Pt saw Pura Torres on 6/21/17. Note reviewed. It was noted; patient presented with an improvement in his extremity pain but continued to experience low back pain despite the increased of Neurontin. He rates 7/10. She set him up for a bilateral L3-L4 and L4-L5 facet blocks, which he received on 6/27/17 with 3-4 month benefit. Patient failed to follow up after block. Preliminary reading of lumbar plain films revealed limited visibility of L5-S1. Otherwise age-appropriate degenerative changes. No acute pathology identified.  Lumbar spine MRI films and report dated 06/14/16 were reviewed. Per report, multilevel degenerative disc and facet joint disease. No high-grade central canal stenosis. Bilateral L5 pars defect and grade 1 anterolisthesis of L5/S1. Mild discogenic bone marrow edema signal at L2/3 suggesting inflammation. Multilevel mild to moderate degrees of foraminal stenosis as discussed. Incidentally noted aortic atherosclerosis and focal fusiform 3.2 cm aneurysmal dilation of the distal aorta. At his last clinical appointment, patient was considering blocks at that time. I set him up for a new MRI of the lumbar spine. I will increased his dose of Neurontin to 600 mg TID. The patient returns today with pain location and distribution remain unchanged. He rates his pain 7/10, previously 6/10. Pt is tolerating increased dose of Neurontin to 600 mg TID with no relief. He is completing his HEP 2x daily. Pt denies change in bowel or bladder habits. Lumbar spine MRI dated 3/25/19 reviewed. Per report, grade 1 L5-S1 isthmic/spondylitic spondylolisthesis from chronic L5 pars spondylolysis. Bilateral L5-S1 foraminal reorientation with moderate left and mild to moderate right foraminal stenosis, and with impingement on the left and borderline but probably also right exiting L5 nerve roots. Small protruded left foraminal L3-4 disc herniation with minimal impingement on the left exiting L3 nerve root. Minimal L2-3 and L3-4 central stenosis from mild disc bulging and spondylosis with outer annular fissures. Multilevel low-grade foraminal stenosis greatest on the left at L3-4. Incipient minimal right paracentral L1-2 protruded disc herniation without root impingement. 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 Mild paraspinal muscle atrophy.  reviewed. Body mass index is 29.19 kg/m².       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    Kidney disease     Nephrolithiasis     OA (osteoarthritis)         Social History     Socioeconomic History    Marital status:      Spouse name: Not on file    Number of children: Not on file    Years of education: Not on file    Highest education level: Not on file   Occupational History    Not on file   Social Needs    Financial resource strain: Not on file    Food insecurity:     Worry: Not on file     Inability: Not on file    Transportation needs:     Medical: Not on file     Non-medical: Not on file   Tobacco Use    Smoking status: Current Every Day Smoker     Packs/day: 1.00     Years: 56.00     Pack years: 56.00    Smokeless tobacco: Never Used    Tobacco comment: he smokes a few per day, although he used to smoke one pack a day regularly   Substance and Sexual Activity    Alcohol use: No    Drug use: Not on file    Sexual activity: Not on file   Lifestyle    Physical activity:     Days per week: Not on file     Minutes per session: Not on file    Stress: Not on file   Relationships    Social connections:     Talks on phone: Not on file     Gets together: Not on file     Attends [employer] service: Not on file     Active member of club or organization: Not on file     Attends meetings of clubs or organizations: Not on file     Relationship status: Not on file    Intimate partner violence:     Fear of current or ex partner: Not on file     Emotionally abused: Not on file     Physically abused: Not on file     Forced sexual activity: Not on file   Other Topics Concern    Not on file   Social History Narrative    Not on file       Current Outpatient Medications   Medication Sig Dispense Refill    gabapentin (NEURONTIN) 600 mg tablet Take 1 Tab by mouth three (3) times daily. 90 Tab 1    tamsulosin (FLOMAX) 0.4 mg capsule TAKE 1 CAPSULE BY MOUTH DAILY AFTER DINNER 90 Cap 1    amLODIPine (NORVASC) 5 mg tablet TK 1 T PO QD  0    gabapentin (NEURONTIN) 600 mg tablet TAKE 1 TABLET BY MOUTH THREE TIMES DAILY 270 Tab 0    traZODone (DESYREL) 100 mg tablet TK 1 T PO QD HS  0    SYMBICORT 160-4.5 mcg/actuation HFA inhaler INL 2 PFS PO TWICE DAILY. RINSE MOUTH AFTER U  3    insulin lispro (HUMALOG) 100 unit/mL injection [id number] (RxNorm)      SPIRIVA WITH HANDIHALER 18 mcg inhalation capsule       pravastatin (PRAVACHOL) 40 mg tablet 40 mg.      glipiZIDE SR (GLUCOTROL) 5 mg CR tablet   0    losartan (COZAAR) 100 mg tablet Take 50 mg by mouth daily.  furosemide (LASIX) 20 mg tablet Take  by mouth daily.  sildenafil citrate (VIAGRA) 100 mg tablet Take 100 mg by mouth as needed.  aspirin (ASPIRIN) 325 mg tablet Take 81 mg by mouth daily.  diazePAM (VALIUM) 10 mg tablet Take 1 tablet PO 30 Minutes prior to procedure. 1 Tab 0    levoFLOXacin (LEVAQUIN) 500 mg tablet Take 1 Tab by mouth daily. 14 Tab 0    levoFLOXacin (LEVAQUIN) 500 mg tablet       HYDROcodone-acetaminophen (NORCO) 5-325 mg per tablet TK 1 T PO Q SIX H PRN P  0    ibuprofen (MOTRIN) 600 mg tablet TK 1 T PO Q SIX H WITH FOOD  PRN P  0    methocarbamol (ROBAXIN) 750 mg tablet TK 2 TS PO TID  0    traMADol (ULTRAM) 50 mg tablet TK 1 T PO QD FOR 14 DAYS PRN  0    trimethoprim-sulfamethoxazole (BACTRIM, SEPTRA)  mg per tablet Take 1 Tab by mouth two (2) times a day. 10 Tab 0    VENTOLIN HFA 90 mcg/actuation inhaler INL 1 TO 2 PFS PO Q 4 H FOR 6 H PRF SOB OR WHZ OR COUGH  11    VIRT-KAILYN 2.5-25-1 mg tablet       esomeprazole (NEXIUM) 40 mg capsule   6    pioglitazone (ACTOS) 15 mg tablet   0    lisinopril (PRINIVIL, ZESTRIL) 10 mg tablet Take  by mouth daily.          No Known Allergies       PHYSICAL EXAMINATION    Visit Vitals  /52 Pulse 70   Temp 98.3 °F (36.8 °C) (Oral)   Resp 18   Ht 5' 8\" (1.727 m)   Wt 192 lb (87.1 kg)   SpO2 96%   BMI 29.19 kg/m²       CONSTITUTIONAL: NAD, A&O x 3  SENSATION: Intact to light touch throughout  RANGE OF MOTION: The patient has full passive range of motion in all four extremities. MOTOR:  Straight Leg Raise: Negative, bilateral               Hip Flex Knee Ext Knee Flex Ankle DF GTE Ankle PF Tone   Right +4/5 +4/5 +4/5 +4/5 +4/5 +4/5 +4/5   Left +4/5 +4/5 +4/5 +4/5 +4/5 +4/5 +4/5       ASSESSMENT   Diagnoses and all orders for this visit:    1. Lumbar spondylosis with myelopathy    2. Lumbar radiculopathy    3. BMI 28.0-28.9,adult    4. Spondylolisthesis, acquired    5. Spondylolysis, lumbosacral          IMPRESSION AND PLAN:  Patient wishes to proceed with blocks at this time. I will order bilateral S1 SNRBs bilateral L5-S1 facets. Patient should continue on Neurontin 600 mg TID, as it is too early to assess the full benefits of this dose or increase the dose at this time. I recommend he continue to complete his HEP daily. Patient is neurologically intact. I will see the patient back following blocks. Written by Phi Aleman, as dictated by Reina Espinosa MD  I examined the patient, reviewed and agree with the note.

## 2019-04-10 DIAGNOSIS — M54.16 LUMBAR NEURITIS: ICD-10-CM

## 2019-04-10 DIAGNOSIS — M47.16 LUMBAR SPONDYLOSIS WITH MYELOPATHY: ICD-10-CM

## 2019-04-19 ENCOUNTER — OFFICE VISIT (OUTPATIENT)
Dept: ORTHOPEDIC SURGERY | Age: 79
End: 2019-04-19

## 2019-04-19 VITALS
TEMPERATURE: 96.2 F | SYSTOLIC BLOOD PRESSURE: 124 MMHG | WEIGHT: 191.2 LBS | OXYGEN SATURATION: 97 % | DIASTOLIC BLOOD PRESSURE: 50 MMHG | HEART RATE: 59 BPM | BODY MASS INDEX: 28.98 KG/M2 | RESPIRATION RATE: 18 BRPM | HEIGHT: 68 IN

## 2019-04-19 DIAGNOSIS — M54.16 LUMBAR RADICULOPATHY: Primary | ICD-10-CM

## 2019-04-19 DIAGNOSIS — M47.16 LUMBAR SPONDYLOSIS WITH MYELOPATHY: ICD-10-CM

## 2019-04-19 DIAGNOSIS — M43.07 SPONDYLOLYSIS, LUMBOSACRAL: ICD-10-CM

## 2019-04-19 RX ORDER — GABAPENTIN 800 MG/1
800 TABLET ORAL 3 TIMES DAILY
Qty: 90 TAB | Refills: 1 | Status: SHIPPED | OUTPATIENT
Start: 2019-04-19 | End: 2021-07-22

## 2019-04-19 NOTE — PROGRESS NOTES
[facility]  [address], [address]  Phone: [phone]  Fax: [phone]        PROGRESS NOTE      HISTORY OF PRESENT ILLNESS:  The patient is a 70 y.o. male and was seen today for follow up of low back pain into posterior BLE to the knee, with equal severity in LE. He describes symptoms consistent with stenosis. Notes from Dr. Rashida Pan dated 5/3/16 indicates he has joint pain as well as back pain that is not radiating. According to the note, he has increased pain with walking. He has been treated with muscle relaxants, Tramadol, and nonsteroidal anti-inflammatories without relief. Note from Marlon Garcia NP dated 9/12/16 indicating patient was seen for f/u of low back pain which does radiate. Patient reports he went to therapy without relief. He continues with his HEP daily. He denies previous blocks or surgery. He states Medrol Dosepak provided no relief. He reports following Medrol Dosepak, blood sugars jim slightly above 200. He tolerates Neurontin 100 mg with slight relief. Patient reports his renal function is doing well. Pt is being monitored by his PCP for his aortic aneurysms. The patient has a history of DM and reports blood sugars are well controlled, consistently remaining around 150. She is followed by Uriah Brown NP for his DM. Patient denies change in bowel or bladder habits. Pt saw Young Goldstein on 6/21/17. Note reviewed. It was noted; patient presented with an improvement in his extremity pain but continued to experience low back pain despite the increased of Neurontin. He rates 7/10. She set him up for a bilateral L3-L4 and L4-L5 facet blocks, which he received on 6/27/17 with 3-4 month benefit. Patient failed to follow up after block. Preliminary reading of lumbar plain films revealed limited visibility of L5-S1. Otherwise age-appropriate degenerative changes. No acute pathology identified.  Lumbar spine MRI films and report dated 06/14/16 were reviewed. Per report, multilevel degenerative disc and facet joint disease. No high-grade central canal stenosis. Bilateral L5 pars defect and grade 1 anterolisthesis of L5/S1. Mild discogenic bone marrow edema signal at L2/3 suggesting inflammation. Multilevel mild to moderate degrees of foraminal stenosis as discussed. Incidentally noted aortic atherosclerosis and focal fusiform 3.2 cm aneurysmal dilation of the distal aorta. Lumbar spine MRI dated 3/25/19 reviewed. Per report, grade 1 L5-S1 isthmic/spondylitic spondylolisthesis from chronic L5 pars spondylolysis. Bilateral L5-S1 foraminal reorientation with moderate left and mild to moderate right foraminal stenosis, and with impingement on the left and borderline but probably also right exiting L5 nerve roots. Small protruded left foraminal L3-4 disc herniation with minimal impingement on the left exiting L3 nerve root. Minimal L2-3 and L3-4 central stenosis from mild disc bulging and spondylosis with outer annular fissures. Multilevel low-grade foraminal stenosis greatest on the left at L3-4. Incipient minimal right paracentral L1-2 protruded disc herniation without root impingement. 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 Mild paraspinal muscle atrophy. At his last clinical appointment, patient wished to proceed with blocks at that time. I ordered bilateral S1 SNRBs bilateral L5-S1 facets. Patient should continue on Neurontin 600 mg TID, as it was too early to assess the full benefits of this dose or increase the dose at that time. I recommended he continue to complete his HEP daily. The patient returns today with pain location and distribution remain unchanged. He rates his pain 4-5/10, previously 7/10. Pt is compliant with Neurontin 600 mg TID with good relief. He is performing his HEP daily. Pt denies change in bowel or bladder habits.  Pt underwent bilateral S1 SNRBs bilateral L5-S1 facets on 4/2/19 with good relief.  reviewed. Body mass index is 29.07 kg/m².       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    Kidney disease     Nephrolithiasis     OA (osteoarthritis)         Social History     Socioeconomic History    Marital status:      Spouse name: Not on file    Number of children: Not on file    Years of education: Not on file    Highest education level: Not on file   Occupational History    Not on file   Social Needs    Financial resource strain: Not on file    Food insecurity:     Worry: Not on file     Inability: Not on file    Transportation needs:     Medical: Not on file     Non-medical: Not on file   Tobacco Use    Smoking status: Current Every Day Smoker     Packs/day: 1.00     Years: 56.00     Pack years: 56.00    Smokeless tobacco: Never Used    Tobacco comment: he smokes a few per day, although he used to smoke one pack a day regularly   Substance and Sexual Activity    Alcohol use: No    Drug use: Not on file    Sexual activity: Not on file   Lifestyle    Physical activity:     Days per week: Not on file     Minutes per session: Not on file    Stress: Not on file   Relationships    Social connections:     Talks on phone: Not on file     Gets together: Not on file     Attends [employer] service: Not on file     Active member of club or organization: Not on file     Attends meetings of clubs or organizations: Not on file     Relationship status: Not on file    Intimate partner violence:     Fear of current or ex partner: Not on file     Emotionally abused: Not on file     Physically abused: Not on file     Forced sexual activity: Not on file   Other Topics Concern    Not on file   Social History Narrative    Not on file       Current Outpatient Medications   Medication Sig Dispense Refill    gabapentin (NEURONTIN) 600 mg tablet Take 1 Tab by mouth three (3) times daily. 90 Tab 1    amLODIPine (NORVASC) 5 mg tablet TK 1 T PO QD  0    gabapentin (NEURONTIN) 600 mg tablet TAKE 1 TABLET BY MOUTH THREE TIMES DAILY 270 Tab 0    traZODone (DESYREL) 100 mg tablet TK 1 T PO QD HS  0    SYMBICORT 160-4.5 mcg/actuation HFA inhaler INL 2 PFS PO TWICE DAILY. RINSE MOUTH AFTER U  3    SPIRIVA WITH HANDIHALER 18 mcg inhalation capsule       pravastatin (PRAVACHOL) 40 mg tablet 40 mg.      glipiZIDE SR (GLUCOTROL) 5 mg CR tablet   0    losartan (COZAAR) 100 mg tablet Take 50 mg by mouth daily.  furosemide (LASIX) 20 mg tablet Take  by mouth daily.  sildenafil citrate (VIAGRA) 100 mg tablet Take 100 mg by mouth as needed.  aspirin (ASPIRIN) 325 mg tablet Take 81 mg by mouth daily.  diazePAM (VALIUM) 10 mg tablet Take 1 tablet PO 30 Minutes prior to procedure. 1 Tab 0    levoFLOXacin (LEVAQUIN) 500 mg tablet Take 1 Tab by mouth daily. 14 Tab 0    tamsulosin (FLOMAX) 0.4 mg capsule TAKE 1 CAPSULE BY MOUTH DAILY AFTER DINNER 90 Cap 1    levoFLOXacin (LEVAQUIN) 500 mg tablet       HYDROcodone-acetaminophen (NORCO) 5-325 mg per tablet TK 1 T PO Q SIX H PRN P  0    ibuprofen (MOTRIN) 600 mg tablet TK 1 T PO Q SIX H WITH FOOD  PRN P  0    methocarbamol (ROBAXIN) 750 mg tablet TK 2 TS PO TID  0    traMADol (ULTRAM) 50 mg tablet TK 1 T PO QD FOR 14 DAYS PRN  0    trimethoprim-sulfamethoxazole (BACTRIM, SEPTRA)  mg per tablet Take 1 Tab by mouth two (2) times a day.  10 Tab 0    VENTOLIN HFA 90 mcg/actuation inhaler INL 1 TO 2 PFS PO Q 4 H FOR 6 H PRF SOB OR WHZ OR COUGH  11    insulin lispro (HUMALOG) 100 unit/mL injection [id number] (RxNorm)      VIRT-KAILYN 2.5-25-1 mg tablet       esomeprazole (NEXIUM) 40 mg capsule   6    pioglitazone (ACTOS) 15 mg tablet   0    lisinopril (PRINIVIL, ZESTRIL) 10 mg tablet Take  by mouth daily. No Known Allergies       PHYSICAL EXAMINATION    Visit Vitals  /50   Pulse (!) 59   Temp 96.2 °F (35.7 °C) (Oral)   Resp 18   Ht 5' 8\" (1.727 m)   Wt 191 lb 3.2 oz (86.7 kg)   SpO2 97%   BMI 29.07 kg/m²       CONSTITUTIONAL: NAD, A&O x 3  SENSATION: Intact to light touch throughout  RANGE OF MOTION: The patient has full passive range of motion in all four extremities. MOTOR:  Straight Leg Raise: Negative, bilateral               Hip Flex Knee Ext Knee Flex Ankle DF GTE Ankle PF Tone   Right +4/5 +4/5 +4/5 +4/5 +4/5 +4/5 +4/5   Left +4/5 +4/5 +4/5 +4/5 +4/5 +4/5 +4/5       ASSESSMENT   Diagnoses and all orders for this visit:    1. Lumbar radiculopathy    2. Lumbar spondylosis with myelopathy    3. Spondylolysis, lumbosacral          IMPRESSION AND PLAN:  Patient is s/p bilateral S1 SNRBs bilateral L5-S1 facets noting good relief. I will increase his dose of Neurontin to 800 mg TID. Patient advised to call the office if intolerant to higher dose. Patient is not interested in surgical intervention or additional lumbar blocks at this time. I recommend he continue to perform his HEP daily. Patient is neurologically intact. I will see the patient back in 1 month's time or earlier if needed. Written by Kelly Hodges, as dictated by Chuck Castro MD  I examined the patient, reviewed and agree with the note.

## 2019-04-19 NOTE — LETTER
4/19/19 Patient: Albert Garcia YOB: 1940 Date of Visit: 4/19/2019 Cyndi Michael, [address] [facility] [id number] [place] [address] VIA Facsimile: [phone] Dear Cyndi Michael, NP, Thank you for referring Mr. Brandon Caba to [address] [place] [address] for evaluation. My notes for this consultation are attached. If you have questions, please do not hesitate to call me. I look forward to following your patient along with you. Sincerely, Anh Ospina MD

## 2019-05-23 ENCOUNTER — DOCUMENTATION ONLY (OUTPATIENT)
Dept: ORTHOPEDIC SURGERY | Age: 79
End: 2019-05-23

## 2020-08-12 ENCOUNTER — TELEPHONE (OUTPATIENT)
Dept: FAMILY MEDICINE CLINIC | Age: 80
End: 2020-08-12

## 2020-08-12 DIAGNOSIS — N18.30 CKD (CHRONIC KIDNEY DISEASE), STAGE III (HCC): Primary | ICD-10-CM

## 2020-08-16 DIAGNOSIS — N18.30 CHRONIC RENAL DISEASE, STAGE 3, MODERATELY DECREASED GLOMERULAR FILTRATION RATE (GFR) BETWEEN 30-59 ML/MIN/1.73 SQUARE METER (HCC): Primary | ICD-10-CM

## 2020-08-17 DIAGNOSIS — N18.30 CHRONIC RENAL DISEASE, STAGE 3, MODERATELY DECREASED GLOMERULAR FILTRATION RATE (GFR) BETWEEN 30-59 ML/MIN/1.73 SQUARE METER (HCC): ICD-10-CM

## 2020-08-17 RX ORDER — TIOTROPIUM BROMIDE INHALATION SPRAY 3.12 UG/1
SPRAY, METERED RESPIRATORY (INHALATION)
OUTPATIENT
Start: 2020-08-17

## 2020-08-17 RX ORDER — ALBUTEROL SULFATE 90 UG/1
AEROSOL, METERED RESPIRATORY (INHALATION)
Qty: 25.5 G | OUTPATIENT
Start: 2020-08-17

## 2020-08-18 LAB — CREATININE, EXTERNAL: 2.5

## 2020-09-08 RX ORDER — SITAGLIPTIN 25 MG/1
TABLET, FILM COATED ORAL
Qty: 60 TAB | Refills: 0 | Status: SHIPPED | OUTPATIENT
Start: 2020-09-08 | End: 2020-11-11

## 2020-10-13 ENCOUNTER — TELEPHONE (OUTPATIENT)
Dept: FAMILY MEDICINE CLINIC | Age: 80
End: 2020-10-13

## 2020-10-13 DIAGNOSIS — E78.49 OTHER HYPERLIPIDEMIA: Primary | ICD-10-CM

## 2020-10-13 RX ORDER — PRAVASTATIN SODIUM 40 MG/1
40 TABLET ORAL DAILY
Qty: 90 TAB | Refills: 1 | Status: SHIPPED | OUTPATIENT
Start: 2020-10-13 | End: 2021-03-18

## 2020-11-11 RX ORDER — SITAGLIPTIN 25 MG/1
TABLET, FILM COATED ORAL
Qty: 60 TAB | Refills: 0 | Status: SHIPPED | OUTPATIENT
Start: 2020-11-11 | End: 2021-07-22

## 2020-11-30 RX ORDER — GLIPIZIDE 5 MG/1
TABLET, FILM COATED, EXTENDED RELEASE ORAL
Qty: 90 TAB | OUTPATIENT
Start: 2020-11-30

## 2020-12-04 RX ORDER — GLIPIZIDE 5 MG/1
TABLET, FILM COATED, EXTENDED RELEASE ORAL
Qty: 90 TAB | Refills: 1 | Status: SHIPPED | OUTPATIENT
Start: 2020-12-04 | End: 2021-04-18

## 2021-01-01 ENCOUNTER — HOSPITAL ENCOUNTER (OUTPATIENT)
Dept: LAB | Age: 81
Discharge: HOME | End: 2021-12-27
Payer: COMMERCIAL

## 2021-01-01 ENCOUNTER — OFFICE VISIT (OUTPATIENT)
Dept: FAMILY MEDICINE CLINIC | Age: 81
End: 2021-01-01
Payer: COMMERCIAL

## 2021-01-01 VITALS
HEART RATE: 64 BPM | HEIGHT: 68 IN | BODY MASS INDEX: 22.94 KG/M2 | SYSTOLIC BLOOD PRESSURE: 154 MMHG | OXYGEN SATURATION: 100 % | WEIGHT: 151.4 LBS | DIASTOLIC BLOOD PRESSURE: 76 MMHG

## 2021-01-01 DIAGNOSIS — E03.8 SUBCLINICAL HYPOTHYROIDISM: ICD-10-CM

## 2021-01-01 DIAGNOSIS — E78.49 OTHER HYPERLIPIDEMIA: ICD-10-CM

## 2021-01-01 DIAGNOSIS — N40.1 BENIGN PROSTATIC HYPERPLASIA WITH LOWER URINARY TRACT SYMPTOMS, SYMPTOM DETAILS UNSPECIFIED: ICD-10-CM

## 2021-01-01 DIAGNOSIS — R91.8 LUNG NODULES: ICD-10-CM

## 2021-01-01 DIAGNOSIS — I71.40 ABDOMINAL AORTIC ANEURYSM (AAA) WITHOUT RUPTURE: ICD-10-CM

## 2021-01-01 DIAGNOSIS — N20.0 NEPHROLITHIASIS: ICD-10-CM

## 2021-01-01 DIAGNOSIS — J44.9 CHRONIC OBSTRUCTIVE PULMONARY DISEASE, UNSPECIFIED COPD TYPE (HCC): ICD-10-CM

## 2021-01-01 DIAGNOSIS — E11.22 TYPE 2 DIABETES MELLITUS WITH STAGE 4 CHRONIC KIDNEY DISEASE, WITHOUT LONG-TERM CURRENT USE OF INSULIN (HCC): Primary | ICD-10-CM

## 2021-01-01 DIAGNOSIS — Z72.0 TOBACCO USE: ICD-10-CM

## 2021-01-01 DIAGNOSIS — F33.1 MODERATE EPISODE OF RECURRENT MAJOR DEPRESSIVE DISORDER (HCC): ICD-10-CM

## 2021-01-01 DIAGNOSIS — N18.4 TYPE 2 DIABETES MELLITUS WITH STAGE 4 CHRONIC KIDNEY DISEASE, WITHOUT LONG-TERM CURRENT USE OF INSULIN (HCC): Primary | ICD-10-CM

## 2021-01-01 LAB
25(OH)D3 SERPL-MCNC: 12.1 NG/ML (ref 30–100)
ALBUMIN SERPL-MCNC: 3.5 G/DL (ref 3.5–4.7)
ALBUMIN/GLOB SERPL: 0.9 {RATIO}
ALP SERPL-CCNC: 131 U/L (ref 38–126)
ALT SERPL-CCNC: 9 U/L (ref 3–72)
ANION GAP SERPL CALC-SCNC: 14 MMOL/L
AST SERPL W P-5'-P-CCNC: 9 U/L (ref 17–74)
BASOPHILS # BLD: 0 K/UL (ref 0–0.1)
BASOPHILS NFR BLD: 0 % (ref 0–2)
BILIRUB SERPL-MCNC: 0.6 MG/DL (ref 0.2–1)
BUN SERPL-MCNC: 40 MG/DL (ref 9–21)
BUN/CREAT SERPL: 16
CA-I BLD-MCNC: 8.5 MG/DL (ref 8.5–10.1)
CA-I BLD-MCNC: 8.9 MG/DL (ref 8.5–10.5)
CHLORIDE SERPL-SCNC: 109 MMOL/L (ref 94–111)
CHOLEST SERPL-MCNC: 121 MG/DL
CO2 SERPL-SCNC: 17 MMOL/L (ref 21–33)
CREAT SERPL-MCNC: 2.5 MG/DL (ref 0.8–1.5)
DIFFERENTIAL METHOD BLD: ABNORMAL
EOSINOPHIL # BLD: 0.1 K/UL (ref 0–0.4)
EOSINOPHIL NFR BLD: 1 % (ref 0–5)
ERYTHROCYTE [DISTWIDTH] IN BLOOD BY AUTOMATED COUNT: 16.6 % (ref 11.6–14.5)
EST. AVERAGE GLUCOSE BLD GHB EST-MCNC: 146 MG/DL
GLOBULIN SER CALC-MCNC: 4 G/DL
GLUCOSE SERPL-MCNC: 166 MG/DL (ref 70–110)
HBA1C MFR BLD HPLC: 6.6 %
HBA1C MFR BLD: 6.7 % (ref 4.2–5.6)
HCT VFR BLD AUTO: 33.2 % (ref 36–48)
HDLC SERPL-MCNC: 52 MG/DL (ref 40–60)
HDLC SERPL: 2.3 {RATIO} (ref 0–5)
HGB BLD-MCNC: 9.5 G/DL (ref 13–16)
IMM GRANULOCYTES # BLD AUTO: 0 K/UL (ref 0–0.04)
IMM GRANULOCYTES NFR BLD AUTO: 0 % (ref 0–0.5)
LDLC SERPL CALC-MCNC: 48.6 MG/DL (ref 0–100)
LIPID PROFILE,FLP: NORMAL
LYMPHOCYTES # BLD: 1.5 K/UL (ref 0.9–3.6)
LYMPHOCYTES NFR BLD: 17 % (ref 21–52)
MAGNESIUM SERPL-MCNC: 2 MG/DL (ref 1.7–2.8)
MCH RBC QN AUTO: 25 PG (ref 24–34)
MCHC RBC AUTO-ENTMCNC: 28.6 G/DL (ref 31–37)
MCV RBC AUTO: 87.4 FL (ref 78–100)
MONOCYTES # BLD: 0.8 K/UL (ref 0.05–1.2)
MONOCYTES NFR BLD: 9 % (ref 3–10)
NEUTS SEG # BLD: 6.7 K/UL (ref 1.8–8)
NEUTS SEG NFR BLD: 73 % (ref 40–73)
NRBC # BLD: 0 K/UL (ref 0–0.01)
NRBC BLD-RTO: 0 PER 100 WBC
PHOSPHATE SERPL-MCNC: 3.7 MG/DL (ref 2.5–4.5)
PLATELET # BLD AUTO: 310 K/UL (ref 135–420)
PLATELET COMMENTS,PCOM: ADEQUATE
PMV BLD AUTO: 10.6 FL (ref 9.2–11.8)
POTASSIUM SERPL-SCNC: 5.5 MMOL/L (ref 3.2–5.1)
PROT SERPL-MCNC: 7.5 G/DL (ref 6.1–8.4)
PTH-INTACT SERPL-MCNC: 237 PG/ML (ref 18.4–88)
RBC # BLD AUTO: 3.8 M/UL (ref 4.35–5.65)
RBC MORPH BLD: ABNORMAL
SODIUM SERPL-SCNC: 140 MMOL/L (ref 135–145)
T3FREE SERPL-MCNC: 1.6 PG/ML (ref 2.2–4)
T4 FREE SERPL-MCNC: 1 NG/DL (ref 0.7–1.5)
TRIGL SERPL-MCNC: 102 MG/DL (ref ?–150)
TSH SERPL DL<=0.05 MIU/L-ACNC: 1.5 UIU/ML (ref 0.35–6.2)
URATE SERPL-MCNC: 5.6 MG/DL (ref 3.5–8.5)
VIT B12 SERPL-MCNC: 500 PG/ML (ref 211–911)
VLDLC SERPL CALC-MCNC: 20.4 MG/DL
WBC # BLD AUTO: 9.1 K/UL (ref 4.6–13.2)

## 2021-01-01 PROCEDURE — 82306 VITAMIN D 25 HYDROXY: CPT

## 2021-01-01 PROCEDURE — 81001 URINALYSIS AUTO W/SCOPE: CPT

## 2021-01-01 PROCEDURE — 83036 HEMOGLOBIN GLYCOSYLATED A1C: CPT

## 2021-01-01 PROCEDURE — 80061 LIPID PANEL: CPT

## 2021-01-01 PROCEDURE — 84439 ASSAY OF FREE THYROXINE: CPT

## 2021-01-01 PROCEDURE — 83735 ASSAY OF MAGNESIUM: CPT

## 2021-01-01 PROCEDURE — 82043 UR ALBUMIN QUANTITATIVE: CPT

## 2021-01-01 PROCEDURE — 99406 BEHAV CHNG SMOKING 3-10 MIN: CPT | Performed by: STUDENT IN AN ORGANIZED HEALTH CARE EDUCATION/TRAINING PROGRAM

## 2021-01-01 PROCEDURE — 80053 COMPREHEN METABOLIC PANEL: CPT

## 2021-01-01 PROCEDURE — 84481 FREE ASSAY (FT-3): CPT

## 2021-01-01 PROCEDURE — 84100 ASSAY OF PHOSPHORUS: CPT

## 2021-01-01 PROCEDURE — 83036 HEMOGLOBIN GLYCOSYLATED A1C: CPT | Performed by: STUDENT IN AN ORGANIZED HEALTH CARE EDUCATION/TRAINING PROGRAM

## 2021-01-01 PROCEDURE — 99214 OFFICE O/P EST MOD 30 MIN: CPT | Performed by: STUDENT IN AN ORGANIZED HEALTH CARE EDUCATION/TRAINING PROGRAM

## 2021-01-01 PROCEDURE — 84550 ASSAY OF BLOOD/URIC ACID: CPT

## 2021-01-01 PROCEDURE — 36415 COLL VENOUS BLD VENIPUNCTURE: CPT

## 2021-01-01 PROCEDURE — 84443 ASSAY THYROID STIM HORMONE: CPT

## 2021-01-01 PROCEDURE — 85025 COMPLETE CBC W/AUTO DIFF WBC: CPT

## 2021-01-01 PROCEDURE — 83970 ASSAY OF PARATHORMONE: CPT

## 2021-01-01 PROCEDURE — 82607 VITAMIN B-12: CPT

## 2021-01-01 RX ORDER — SODIUM BICARBONATE 650 MG/1
650 TABLET ORAL 2 TIMES DAILY
Qty: 180 TABLET | Refills: 0 | Status: SHIPPED | OUTPATIENT
Start: 2021-01-01

## 2021-01-01 RX ORDER — BUDESONIDE AND FORMOTEROL FUMARATE DIHYDRATE 160; 4.5 UG/1; UG/1
AEROSOL RESPIRATORY (INHALATION)
Qty: 10.2 G | Refills: 3 | Status: SHIPPED | OUTPATIENT
Start: 2021-01-01

## 2021-01-01 RX ORDER — SERTRALINE HYDROCHLORIDE 50 MG/1
50 TABLET, FILM COATED ORAL DAILY
Qty: 30 TABLET | Refills: 0 | Status: SHIPPED | OUTPATIENT
Start: 2021-01-01 | End: 2022-01-01

## 2021-01-01 RX ORDER — GLIPIZIDE 5 MG/1
TABLET, FILM COATED, EXTENDED RELEASE ORAL
Qty: 90 TABLET | Refills: 1 | Status: SHIPPED | OUTPATIENT
Start: 2021-01-01 | End: 2022-01-01 | Stop reason: SDUPTHER

## 2021-01-01 RX ORDER — ERGOCALCIFEROL 1.25 MG/1
50000 CAPSULE ORAL
Qty: 30 CAPSULE | Refills: 0 | Status: SHIPPED | OUTPATIENT
Start: 2021-01-01

## 2021-01-01 RX ORDER — PRAVASTATIN SODIUM 40 MG/1
40 TABLET ORAL DAILY
Qty: 90 TABLET | Refills: 1 | Status: SHIPPED | OUTPATIENT
Start: 2021-01-01 | End: 2022-01-01

## 2021-01-01 RX ORDER — IBUPROFEN 200 MG
1 TABLET ORAL EVERY 24 HOURS
Qty: 30 PATCH | Refills: 0 | Status: SHIPPED | OUTPATIENT
Start: 2021-01-01 | End: 2022-01-01

## 2021-01-01 RX ORDER — TIOTROPIUM BROMIDE 18 UG/1
1 CAPSULE ORAL; RESPIRATORY (INHALATION) DAILY
Qty: 30 CAPSULE | Refills: 5 | Status: SHIPPED | OUTPATIENT
Start: 2021-01-01

## 2021-01-01 RX ORDER — SODIUM ZIRCONIUM CYCLOSILICATE 5 G/5G
10 POWDER, FOR SUSPENSION ORAL DAILY
Qty: 30 PACKET | Refills: 5 | Status: SHIPPED | OUTPATIENT
Start: 2021-01-01 | End: 2022-01-01

## 2021-01-16 RX ORDER — FUROSEMIDE 20 MG/1
TABLET ORAL
Qty: 90 TAB | Refills: 1 | Status: SHIPPED | OUTPATIENT
Start: 2021-01-16 | End: 2021-01-01

## 2021-01-26 ENCOUNTER — VIRTUAL VISIT (OUTPATIENT)
Dept: FAMILY MEDICINE CLINIC | Age: 81
End: 2021-01-26
Payer: COMMERCIAL

## 2021-01-26 DIAGNOSIS — F51.01 PRIMARY INSOMNIA: ICD-10-CM

## 2021-01-26 DIAGNOSIS — E11.9 TYPE 2 DIABETES MELLITUS WITHOUT COMPLICATION, WITH LONG-TERM CURRENT USE OF INSULIN (HCC): ICD-10-CM

## 2021-01-26 DIAGNOSIS — M15.9 PRIMARY OSTEOARTHRITIS INVOLVING MULTIPLE JOINTS: ICD-10-CM

## 2021-01-26 DIAGNOSIS — Z79.4 TYPE 2 DIABETES MELLITUS WITHOUT COMPLICATION, WITH LONG-TERM CURRENT USE OF INSULIN (HCC): ICD-10-CM

## 2021-01-26 DIAGNOSIS — M54.16 LUMBAR NEURITIS: Primary | ICD-10-CM

## 2021-01-26 PROCEDURE — 99442 PR PHYS/QHP TELEPHONE EVALUATION 11-20 MIN: CPT | Performed by: FAMILY MEDICINE

## 2021-01-26 RX ORDER — MELATONIN 10 MG
10 CAPSULE ORAL
COMMUNITY

## 2021-01-26 NOTE — PROGRESS NOTES
Nuria Dickson presents today for   Chief Complaint   Patient presents with    Sleep Problem     Patient has been unable to sleep for the past 2 weeks. Patient reports he has been taking Melatonin 10mg nightly, sometimes 2 tabs nightly and still having trouble falling asleep. Depression Screening:  3 most recent PHQ Screens 1/26/2021   Little interest or pleasure in doing things Not at all   Feeling down, depressed, irritable, or hopeless Not at all   Total Score PHQ 2 0       Learning Assessment:  Learning Assessment 1/26/2021   PRIMARY LEARNER Patient   HIGHEST LEVEL OF EDUCATION - PRIMARY LEARNER  GRADUATED HIGH SCHOOL OR GED   BARRIERS PRIMARY LEARNER NONE   CO-LEARNER CAREGIVER No   PRIMARY LANGUAGE ENGLISH   LEARNER PREFERENCE PRIMARY DEMONSTRATION   ANSWERED BY Cris VILLANUEVA SELF       Abuse Screening:  No flowsheet data found. Fall Risk  Fall Risk Assessment, last 12 mths 1/26/2021   Able to walk? Yes   Fall in past 12 months? 0   Do you feel unsteady? 1   Are you worried about falling 1       ADL  No flowsheet data found. Health Maintenance reviewed and discussed and ordered per Provider. Health Maintenance Due   Topic Date Due    Foot Exam Q1  03/28/1950    MICROALBUMIN Q1  [birth year]/28/1950    Eye Exam Retinal or Dilated  [birth year]28/1950    COVID-19 Vaccine (1 of 2) 03/28/1958    DTaP/Tdap/Td series (1 - Tdap) 03/28/1963    Shingrix Vaccine Age 50> (1 of 2) 03/28/1992    GLAUCOMA SCREENING Q2Y  03/28/2007    Lipid Screen  07/20/2011    A1C test (Diabetic or Prediabetic)  06/16/2012    Pneumococcal 65+ years (1 of 1 - PPSV23) 07/24/2014    Flu Vaccine (1) 09/01/2020   . Coordination of Care:  1. Have you been to the ER, urgent care clinic since your last visit? Hospitalized since your last visit? No    2. Have you seen or consulted any other health care providers outside of the [address] [place] [address] since your last visit?  Include any pap smears or colon screening.  No

## 2021-01-26 NOTE — PROGRESS NOTES
Feliciano Ward is a 68 y.o. male, evaluated via audio-only technology on 1/26/2021 for Sleep Problem (Patient has been unable to sleep for the past 2 weeks. Patient reports he has been taking Melatonin 10mg nightly, sometimes 2 tabs nightly and still having trouble falling asleep.)  . Assessment & Plan:insomnia the patient is insistent he has been taking up to 20 mg of melatonin with no help I told him he can only take 10 mg.  I do know that some people give upwards of 40 and 50 mg but I am a little uncomfortable with that. I am going to prescribe Mariella 10 mg nightly only 30 days and I have advised this gentleman that he does not need to take sleep medication on a nightly basis I do not really believe that he is not sleeping but 4 hours a night for the last 3 weeks consistent that he is will follow up if any other issues arise this is a 15-minute telephone to telephone interview I am in my office the patient at his home       12  Subjective: Patient is a 68-year-old male who is seen for evaluation today. He is seen with telephone to telephone interaction. He has been taking up to 20 mg of melatonin his wife takes melatonin falls asleep immediately he does not fall asleep till 4 in the morning and then wakes up at eight he is insistent that that is brand-new but has been going on for the last 3 months. He has had no fever or chills no chest pain or shortness of breath. He denies being anxious or depressed. He is not sad life is been appropriate no financial concerns or other concerns no health related issues as well. No rashes no syncope or loss of consciousness. Bowel movements have been appropriate. No chest congestion or cough       Prior to Admission medications    Medication Sig Start Date End Date Taking? Authorizing Provider   melatonin 10 mg capsule Take 10 mg by mouth nightly. Yes Provider, Historical   dronedarone (Multaq) tab tablet Take 400 mg by mouth two (2) times daily (with meals). Yes Provider, Historical   furosemide (LASIX) 20 mg tablet TAKE 1 TABLET BY MOUTH EVERY DAY 1/16/21  Yes Francisco Finn MD   glipiZIDE SR (GLUCOTROL XL) 5 mg CR tablet TAKE 1 TABLET BY MOUTH EVERY DAY 12/4/20  Yes Francisco Finn MD   Januvia 25 mg tablet Take 1 tablet by mouth once daily 11/11/20  Yes Francisco Finn MD   pravastatin (PRAVACHOL) 40 mg tablet Take 1 Tab by mouth daily. 10/13/20  Yes Francisco Finn MD   tamsulosin (FLOMAX) 0.4 mg capsule TAKE 1 CAPSULE BY MOUTH DAILY AFTER DINNER 1/10/19  Yes Karson Madsen MD   gabapentin (NEURONTIN) 600 mg tablet TAKE 1 TABLET BY MOUTH THREE TIMES DAILY 8/23/17  Yes Juliet Avila NP   SYMBICORT 160-4.5 mcg/actuation HFA inhaler INL 2 PFS PO TWICE DAILY. RINSE MOUTH AFTER U 3/22/17  Yes Provider, Historical   VENTOLIN HFA 90 mcg/actuation inhaler INL 1 TO 2 PFS PO Q 4 H FOR 6 H PRF SOB OR WHZ OR COUGH 3/21/17  Yes Provider, Historical   insulin lispro (HUMALOG) 100 unit/mL injection Patient only takes when he needs it 11/27/15  Yes Provider, Historical   SPIRIVA WITH HANDIHALER 18 mcg inhalation capsule  5/30/16  Yes Provider, Historical   aspirin (ASPIRIN) 325 mg tablet Take 81 mg by mouth daily. Yes Provider, Historical   gabapentin (NEURONTIN) 800 mg tablet Take 1 Tab by mouth three (3) times daily. 4/19/19   Dottie Bolivar MD   gabapentin (NEURONTIN) 600 mg tablet Take 1 Tab by mouth three (3) times daily. 3/11/19   Linsey Coombs MD   diazePAM (VALIUM) 10 mg tablet Take 1 tablet PO 30 Minutes prior to procedure. 3/11/19   Linsey Coombs MD   levoFLOXacin (LEVAQUIN) 500 mg tablet Take 1 Tab by mouth daily.  1/17/19   Josey Zuñiga MD   levoFLOXacin (LEVAQUIN) 500 mg tablet  5/23/18   Provider, Historical   amLODIPine (NORVASC) 5 mg tablet TK 1 T PO QD 3/28/18   Provider, Historical   HYDROcodone-acetaminophen (NORCO) 5-325 mg per tablet TK 1 T PO Q SIX H PRN P 1/22/18   Provider, Historical   ibuprofen (MOTRIN) 600 mg tablet TK 1 T PO Q SIX H WITH FOOD  PRN P 1/22/18   Provider, Historical   methocarbamol (ROBAXIN) 750 mg tablet TK 2 TS PO TID 1/22/18   Provider, Historical   traMADol (ULTRAM) 50 mg tablet TK 1 T PO QD FOR 14 DAYS PRN 1/19/18   Provider, Historical   trimethoprim-sulfamethoxazole (BACTRIM, SEPTRA)  mg per tablet Take 1 Tab by mouth two (2) times a day. 4/3/18   Bria Martinez MD   traZODone (DESYREL) 100 mg tablet TK 1 T PO QD HS 3/22/17   Provider, Historical   VIRT-KAILYN 2.5-25-1 mg tablet  5/23/16   Provider, Historical   esomeprazole (NEXIUM) 40 mg capsule Take 40 mg by mouth daily. 5/6/16   Provider, Historical   pioglitazone (ACTOS) 15 mg tablet  4/28/16   Provider, Historical   losartan (COZAAR) 100 mg tablet Take 50 mg by mouth daily. Provider, Historical   lisinopril (PRINIVIL, ZESTRIL) 10 mg tablet Take 10 mg by mouth daily. Provider, Historical   sildenafil citrate (VIAGRA) 100 mg tablet Take 100 mg by mouth as needed. 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 HENT: Negative. Eyes: Negative. Respiratory: Negative. Cardiovascular: Negative. Gastrointestinal: Negative. Genitourinary: Negative. Musculoskeletal: Positive for back pain and joint pain. Skin: Negative. Neurological: Negative. Endo/Heme/Allergies: Negative. Psychiatric/Behavioral: The patient has insomnia. No flowsheet data found. Zain Collins, who was evaluated through a patient-initiated, synchronous (real-time) audio only encounter, and/or her healthcare decision maker, is aware that it is a billable service, with coverage as determined by his insurance carrier. He provided verbal consent to proceed: n/a- consent obtained within past 12 months. He has not had a related appointment within my department in the past 7 days or scheduled within the next 24 hours.       Total Time: minutes: 11-20 minutes    Maxwell Blankenship MD

## 2021-03-17 DIAGNOSIS — E78.49 OTHER HYPERLIPIDEMIA: ICD-10-CM

## 2021-03-18 RX ORDER — PRAVASTATIN SODIUM 40 MG/1
TABLET ORAL
Qty: 90 TAB | Refills: 1 | Status: SHIPPED | OUTPATIENT
Start: 2021-03-18 | End: 2021-01-01 | Stop reason: SDUPTHER

## 2021-04-18 RX ORDER — GLIPIZIDE 5 MG/1
TABLET, FILM COATED, EXTENDED RELEASE ORAL
Qty: 90 TAB | Refills: 1 | Status: SHIPPED | OUTPATIENT
Start: 2021-04-18 | End: 2021-01-01

## 2021-07-22 ENCOUNTER — OFFICE VISIT (OUTPATIENT)
Dept: ORTHOPEDIC SURGERY | Age: 81
End: 2021-07-22
Payer: COMMERCIAL

## 2021-07-22 VITALS
BODY MASS INDEX: 21.5 KG/M2 | HEART RATE: 74 BPM | TEMPERATURE: 97.8 F | OXYGEN SATURATION: 100 % | SYSTOLIC BLOOD PRESSURE: 110 MMHG | RESPIRATION RATE: 20 BRPM | WEIGHT: 153.6 LBS | DIASTOLIC BLOOD PRESSURE: 55 MMHG | HEIGHT: 71 IN

## 2021-07-22 DIAGNOSIS — M43.10 SPONDYLOLISTHESIS, ACQUIRED: ICD-10-CM

## 2021-07-22 DIAGNOSIS — M54.50 LOW BACK PAIN WITHOUT SCIATICA, UNSPECIFIED BACK PAIN LATERALITY, UNSPECIFIED CHRONICITY: ICD-10-CM

## 2021-07-22 DIAGNOSIS — M47.816 SPONDYLOSIS OF LUMBAR REGION WITHOUT MYELOPATHY OR RADICULOPATHY: ICD-10-CM

## 2021-07-22 DIAGNOSIS — M54.50 LUMBAR PAIN: Primary | ICD-10-CM

## 2021-07-22 PROCEDURE — 72100 X-RAY EXAM L-S SPINE 2/3 VWS: CPT | Performed by: NURSE PRACTITIONER

## 2021-07-22 PROCEDURE — 99204 OFFICE O/P NEW MOD 45 MIN: CPT | Performed by: NURSE PRACTITIONER

## 2021-07-22 RX ORDER — METHYLPREDNISOLONE 4 MG/1
TABLET ORAL
Qty: 1 DOSE PACK | Refills: 0 | Status: SHIPPED | OUTPATIENT
Start: 2021-07-22 | End: 2021-01-01

## 2021-07-22 NOTE — PROGRESS NOTES
Chief complaint   Chief Complaint   Patient presents with    LOW BACK PAIN    Follow-up       History of Present Illness:  Jose Tucker is a  80 y.o.  male  who comes in today after last being seen April 19, 2019. He states he was doing well until about a week ago he was trying to help his wife up who had fallen on the floor. He felt a pop in his back. Now he is having right-sided back pain. He does not have any radicular pain. He used to have back pain that did radiate into his legs but he states he has been doing well until this incident. He was taking gabapentin 800 mg twice a day. He ran out a year ago and has not needed it since then. States he is doing okay without it. Back in twenty seventeen he had a bilateral L3-4 L4-5 facet block which helped him tremendously and lasted him quite some time. He is hoping he can get a repeat to calm down this back pain he is having. He is retired. He smokes half pack surge per day. He denies fever bowel bladder dysfunction. Physical Exam: The patient is a 30-year-old male well-developed well-nourished who is alert and oriented with a normal mood and affect. Slightly antalgic gait. He did not use any assistive device. He has 4 out of 5 strength bilateral lower extremities. He has pain with hyperextension of her spine. He has a full weightbearing      Assessment and Plan: This is a patient who has lumbar spondylolisthesis and spondylosis and is having a flare of low back pain. I got a LS x-ray. I will give him a Medrol Dosepak for the flare. I will set him up for a right-sided I will three four, L4-5 facet block. We will see him back following the block. He knows not to take the steroid with NSAIDs. And to take it with food. XRAY: 7/22/21   body part: Lumbar/sacral  side (rt/lt): bilateral  number of views taken:2  Findings: no acute finding    The x-ray will be officially read by Dr. Fadi Stephen and scanned into the chart. Medications:  Current Outpatient Medications   Medication Sig Dispense Refill    glipiZIDE SR (GLUCOTROL XL) 5 mg CR tablet TAKE 1 TABLET BY MOUTH EVERY DAY (Patient taking differently: Take 5 mg by mouth daily.) 90 Tab 1    pravastatin (PRAVACHOL) 40 mg tablet TAKE 1 TABLET BY MOUTH DAILY 90 Tab 1    melatonin 10 mg capsule Take 10 mg by mouth nightly.  dronedarone (Multaq) tab tablet Take 400 mg by mouth two (2) times daily (with meals).  furosemide (LASIX) 20 mg tablet TAKE 1 TABLET BY MOUTH EVERY DAY 90 Tab 1    amLODIPine (NORVASC) 5 mg tablet TK 1 T PO QD  0    SYMBICORT 160-4.5 mcg/actuation HFA inhaler INL 2 PFS PO TWICE DAILY. RINSE MOUTH AFTER U  3    VENTOLIN HFA 90 mcg/actuation inhaler INL 1 TO 2 PFS PO Q 4 H FOR 6 H PRF SOB OR WHZ OR COUGH  11    insulin lispro (HUMALOG) 100 unit/mL injection Patient only takes when he needs it      SPIRIVA WITH HANDIHALER 18 mcg inhalation capsule Take 1 Capsule by inhalation daily.  aspirin delayed-release 81 mg tablet Take 81 mg by mouth daily.  suvorexant (BELSOMRA) 10 mg tablet Take 1 Tab by mouth nightly as needed for Insomnia. Max Daily Amount: 10 mg. (Patient not taking: Reported on 7/22/2021) 30 Tab 0    Januvia 25 mg tablet Take 1 tablet by mouth once daily (Patient not taking: Reported on 7/22/2021) 60 Tab 0    gabapentin (NEURONTIN) 800 mg tablet Take 1 Tab by mouth three (3) times daily. (Patient not taking: Reported on 7/22/2021) 90 Tab 1    gabapentin (NEURONTIN) 600 mg tablet Take 1 Tab by mouth three (3) times daily. (Patient not taking: Reported on 7/22/2021) 90 Tab 1    diazePAM (VALIUM) 10 mg tablet Take 1 tablet PO 30 Minutes prior to procedure. (Patient not taking: Reported on 7/22/2021) 1 Tab 0    levoFLOXacin (LEVAQUIN) 500 mg tablet Take 1 Tab by mouth daily.  (Patient not taking: Reported on 7/22/2021) 14 Tab 0    tamsulosin (FLOMAX) 0.4 mg capsule TAKE 1 CAPSULE BY MOUTH DAILY AFTER DINNER (Patient not taking: Reported on 7/22/2021) 90 Cap 1    levoFLOXacin (LEVAQUIN) 500 mg tablet  (Patient not taking: Reported on 7/22/2021)      HYDROcodone-acetaminophen (NORCO) 5-325 mg per tablet TK 1 T PO Q SIX H PRN P (Patient not taking: Reported on 7/22/2021)  0    ibuprofen (MOTRIN) 600 mg tablet TK 1 T PO Q SIX H WITH FOOD  PRN P (Patient not taking: Reported on 7/22/2021)  0    methocarbamol (ROBAXIN) 750 mg tablet TK 2 TS PO TID (Patient not taking: Reported on 7/22/2021)  0    traMADol (ULTRAM) 50 mg tablet TK 1 T PO QD FOR 14 DAYS PRN (Patient not taking: Reported on 7/22/2021)  0    trimethoprim-sulfamethoxazole (BACTRIM, SEPTRA)  mg per tablet Take 1 Tab by mouth two (2) times a day. (Patient not taking: Reported on 7/22/2021) 10 Tab 0    gabapentin (NEURONTIN) 600 mg tablet TAKE 1 TABLET BY MOUTH THREE TIMES DAILY (Patient not taking: Reported on 7/22/2021) 270 Tab 0    traZODone (DESYREL) 100 mg tablet TK 1 T PO QD HS (Patient not taking: Reported on 7/22/2021)  0    VIRT-KAILYN 2.5-25-1 mg tablet  (Patient not taking: Reported on 7/22/2021)      esomeprazole (NEXIUM) 40 mg capsule Take 40 mg by mouth daily. (Patient not taking: Reported on 7/22/2021)  6    pioglitazone (ACTOS) 15 mg tablet  (Patient not taking: Reported on 7/22/2021)  0    losartan (COZAAR) 100 mg tablet Take 50 mg by mouth daily. (Patient not taking: Reported on 7/22/2021)      lisinopril (PRINIVIL, ZESTRIL) 10 mg tablet Take 10 mg by mouth daily. (Patient not taking: Reported on 7/22/2021)      sildenafil citrate (VIAGRA) 100 mg tablet Take 100 mg by mouth as needed.  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 Iggy Graham at [address] [place] [address]      s/p right knee     Social History     Socioeconomic History    Marital status:      Spouse name: Not on file    Number of children: Not on file    Years of education: Not on file    Highest education level: Not on file   Occupational History    Not on file   Tobacco Use    Smoking status: Current Every Day Smoker     Packs/day: 1.00     Years: 56.00     Pack years: 56.00    Smokeless tobacco: Never Used    Tobacco comment: he smokes a few per day, although he used to smoke one pack a day regularly   Vaping Use    Vaping Use: Never used   Substance and Sexual Activity    Alcohol use: No    Drug use: Not Currently    Sexual activity: Yes     Partners: Female   Other Topics Concern    Not on file   Social History Narrative    Not on file     Social Determinants of Health     Financial Resource Strain:     Difficulty of Paying Living Expenses:    Food Insecurity:     Worried About Running Out of Food in the Last Year:     Ran Out of Food in the Last Year:    Transportation Needs:     Lack of Transportation (Medical):      Lack of Transportation (Non-Medical):    Physical Activity:     Days of Exercise per Week:     Minutes of Exercise per Session:    Stress:     Feeling of Stress :    Social Connections:     Frequency of Communication with Friends and Family:     Frequency of Social Gatherings with Friends and Family:     Attends [employer] Services:     Active Member of Clubs or Organizations:     Attends Club or Organization Meetings:     Marital Status:    Intimate Partner Violence:     Fear of Current or Ex-Partner:     Emotionally Abused:     Physically Abused:     Sexually Abused:      Family History   Problem Relation Age of Onset    Diabetes Sister     No Known Problems Brother     No Known Problems Brother     Diabetes Sister     Cancer Mother         breast    Cancer Father        Physical Exam:  Visit Vitals  BP (!) 110/55   Pulse 74   Temp 97.8 °F (36.6 °C) (Temporal)   Resp 20   Ht 5' 10.5\" (1.791 m)   Wt 153 lb 9.6 oz (69.7 kg)   SpO2 100%   BMI 21.73 kg/m²     Pain Scale: 8/10       has been . reviewed and is appropriate          Diagnoses and all orders for this visit:    1.  Lumbar pain  -     AMB POC XRAY, SPINE, LUMBOSACRAL; 2 O                    We have informed Milton Hargrove to notify us for immediate appointment if he has any worsening neurogical symptoms or if an emergency situation presents, then call [phone]

## 2021-07-23 ENCOUNTER — TELEPHONE (OUTPATIENT)
Dept: ORTHOPEDIC SURGERY | Age: 81
End: 2021-07-23

## 2021-07-23 NOTE — TELEPHONE ENCOUNTER
Good day,                   Dr Oneyda Anthony has ordered a spinal injection ( facet  Right side ,L3/4 &L4/5) to be done at an Outpatient facility. We are requesting okay for patient to stop his Aspirin 5 days prior to procedure/ Restart day after procedure.                                            Thank You

## 2021-07-28 ENCOUNTER — TELEPHONE (OUTPATIENT)
Dept: ORTHOPEDIC SURGERY | Age: 81
End: 2021-07-28

## 2021-07-28 DIAGNOSIS — F41.8 TEST ANXIETY: Primary | ICD-10-CM

## 2021-07-28 RX ORDER — DIAZEPAM 10 MG/1
TABLET ORAL
Qty: 1 TABLET | Refills: 0 | Status: SHIPPED | OUTPATIENT
Start: 2021-07-28 | End: 2021-01-01

## 2021-08-03 PROBLEM — N20.0 NEPHROLITHIASIS: Status: RESOLVED | Noted: 2021-01-01 | Resolved: 2021-01-01

## 2021-11-10 NOTE — TELEPHONE ENCOUNTER
Pt needs rx refill. Requested Prescriptions     Pending Prescriptions Disp Refills    pravastatin (PRAVACHOL) 40 mg tablet 90 Tablet 1     Sig: Take 1 Tablet by mouth daily. Appt has been made for 12/27.  Patients that he is completely out of Pravastatin

## 2021-12-27 PROBLEM — E11.22 TYPE 2 DIABETES MELLITUS WITH CHRONIC KIDNEY DISEASE (HCC): Status: ACTIVE | Noted: 2021-01-01

## 2021-12-27 NOTE — PROGRESS NOTES
[facility] presents today for   Chief Complaint   Patient presents with   [address]       Is someone accompanying this pt? No     Is the patient using any DME equipment during OV? No     Depression Screening:  3 most recent PHQ Screens 12/27/2021   Little interest or pleasure in doing things Several days   Feeling down, depressed, irritable, or hopeless Nearly every day   Total Score PHQ 2 4   Trouble falling or staying asleep, or sleeping too much Nearly every day   Feeling tired or having little energy Nearly every day   Poor appetite, weight loss, or overeating Not at all   Feeling bad about yourself - or that you are a failure or have let yourself or your family down Several days   Trouble concentrating on things such as school, work, reading, or watching TV Not at all   Moving or speaking so slowly that other people could have noticed; or the opposite being so fidgety that others notice Nearly every day   Thoughts of being better off dead, or hurting yourself in some way Not at all   PHQ 9 Score 14   How difficult have these problems made it for you to do your work, take care of your home and get along with others Extremely difficult       Learning Assessment:  Learning Assessment 1/26/2021   PRIMARY LEARNER Patient   HIGHEST LEVEL OF EDUCATION - PRIMARY LEARNER  [address]  PRIMARY LEARNER NONE   CO-LEARNER CAREGIVER No   PRIMARY LANGUAGE ENGLISH   LEARNER PREFERENCE PRIMARY DEMONSTRATION   ANSWERED BY Cj Dugan    RELATIONSHIP SELF       Fall Risk  Fall Risk Assessment, last 12 mths 1/26/2021   Able to walk? Yes   Fall in past 12 months? 0   Do you feel unsteady? 1   Are you worried about falling 1       Health Maintenance reviewed and discussed and ordered per Provider.     Health Maintenance Due   Topic Date Due    Hepatitis C Screening  Never done    COVID-19 Vaccine (1) Never done    Foot Exam Q1  Never done    MICROALBUMIN Q1  Never done   [facility] Exam Retinal or Dilated  Never done    DTaP/Tdap/Td series (1 - Tdap) Never done    Shingrix Vaccine Age 50> (1 of 2) Never done    Low dose CT lung screening  Never done    Lipid Screen  07/20/2011    A1C test (Diabetic or Prediabetic)  06/16/2012    Pneumococcal 65+ years (1 of 1 - PPSV23) Never done    Flu Vaccine (1) Never done   . Coordination of Care:  1. Have you been to the ER, urgent care clinic since your last visit? Hospitalized since your last visit? No     2. Have you seen or consulted any other health care providers outside of the [address] [place] [address] since your last visit? Include any pap smears or colon screening.  No

## 2021-12-28 NOTE — PROGRESS NOTES
Subjective: Janny Pepe is a 80 y.o. male who was seen for [facility]    Patient has been severely depressed ever since his wife  earlier this year. He has been sleeping all day and has not been going out of the house. He has not been taking any antidepressants. He also has not been compliant with his inhalers and has been short of breath as well. He is current smoker and has not been quitting. He is interested in quitting smoking. Home Medications    Medication Sig Start Date End Date Taking? Authorizing Provider   nicotine (NICODERM CQ) 21 mg/24 hr 1 Patch by TransDERmal route every twenty-four (24) hours for 30 days. 21 Yes Kae Crews MD   sertraline (ZOLOFT) 50 mg tablet Take 1 Tablet by mouth daily. 21  Yes Kae Crews MD   Symbicort 160-4.5 mcg/actuation HFAA INL 2 PFS PO TWICE DAILY. RINSE MOUTH AFTER U 21  Yes Kae Crews MD   Spiriva with HandiHaler 18 mcg inhalation capsule Take 1 Capsule by inhalation daily. 21  Yes Kae Crews MD   glipiZIDE SR (GLUCOTROL XL) 5 mg CR tablet TAKE 1 TABLET BY MOUTH EVERY DAY 21  Yes Kae Crews MD   pravastatin (PRAVACHOL) 40 mg tablet Take 1 Tablet by mouth daily. 11/10/21  Yes Kae Crews MD   melatonin 10 mg capsule Take 10 mg by mouth nightly. Yes Provider, Historical   dronedarone (Multaq) tab tablet Take 400 mg by mouth two (2) times daily (with meals).    Yes Provider, Historical   VENTOLIN HFA 90 mcg/actuation inhaler INL 1 TO 2 PFS PO Q 4 H FOR 6 H PRF SOB OR WHZ OR COUGH 3/21/17  Yes Provider, Historical   insulin lispro (HUMALOG) 100 unit/mL injection Patient only takes when he needs it 11/27/15  Yes Provider, Historical      No Known Allergies  Social History     Tobacco Use    Smoking status: Current Every Day Smoker     Packs/day: 1.00     Years: 56.00     Pack years: 56.00    Smokeless tobacco: Never Used    Tobacco comment: he smokes a few per day, although he used to smoke one pack a day regularly   Vaping Use    Vaping Use: Never used   Substance Use Topics    Alcohol use: No    Drug use: Not Currently            Review of Systems   Psychiatric/Behavioral: Positive for dysphoric mood. All other systems reviewed and are negative.          Objective:     Visit Vitals  BP (!) 154/76 (BP 1 Location: Right upper arm, BP Patient Position: Sitting, BP Cuff Size: Adult)   Pulse 64   Ht 5' 8\" (1.727 m)   Wt 151 lb 6.4 oz (68.7 kg)   SpO2 100%   BMI 23.02 kg/m²        General: alert, oriented, not in distress  Head: scalp normal, atraumatic  Eyes: pupils are equal and reactive, full and intact EOM's  Ears: patent ear canal, intact tympanic membrane  Nose: normal turbinates, no congestion or discharge  Lips/Mouth: moist lips and buccal mucosa, non-enlarged tonsils, pink throat  Neck: supple, no JVD, no lymphadenopathy, non-palpable thyroid  Chest/Lungs: Decreased breath sounds bilaterally  Heart: normal rate, regular rhythm, no murmur  Abdomen: soft, non-distended, non-tender, normal bowel sounds, no organomegaly, no masses  Extremities: no focal deformities, no edema  Skin: no active skin lesions    Laboratory/Tests:  Hospital Outpatient Visit on 12/27/2021   Component Date Value Ref Range Status    WBC 12/27/2021 9.1  4.6 - 13.2 K/uL Final    RBC 12/27/2021 3.80* 4.35 - 5.65 M/uL Final    HGB 12/27/2021 9.5* 13.0 - 16.0 g/dL Final    HCT 12/27/2021 33.2* 36.0 - 48.0 % Final    MCV 12/27/2021 87.4  78.0 - 100.0 FL Final    MCH 12/27/2021 25.0  24.0 - 34.0 PG Final    MCHC 12/27/2021 28.6* 31.0 - 37.0 g/dL Final    RDW 12/27/2021 16.6* 11.6 - 14.5 % Final    PLATELET 82/94/0135 228  135 - 420 K/uL Final    MPV 12/27/2021 10.6  9.2 - 11.8 FL Final    NRBC 12/27/2021 0.0  0.0  WBC Final    ABSOLUTE NRBC 12/27/2021 0.00  0.00 - 0.01 K/uL Final    NEUTROPHILS 12/27/2021 73  40 - 73 % Final    LYMPHOCYTES 12/27/2021 17* 21 - 52 % Final    MONOCYTES 12/27/2021 9  3 - 10 % Final  EOSINOPHILS 12/27/2021 1  0 - 5 % Final    BASOPHILS 12/27/2021 0  0 - 2 % Final    IMMATURE GRANULOCYTES 12/27/2021 0  0 - 0.5 % Final    ABS. NEUTROPHILS 12/27/2021 6.7  1.8 - 8.0 K/UL Final    ABS. LYMPHOCYTES 12/27/2021 1.5  0.9 - 3.6 K/UL Final    ABS. MONOCYTES 12/27/2021 0.8  0.05 - 1.2 K/UL Final    ABS. EOSINOPHILS 12/27/2021 0.1  0.0 - 0.4 K/UL Final    ABS. BASOPHILS 12/27/2021 0.0  0.0 - 0.1 K/UL Final    ABS. IMM. GRANS. 12/27/2021 0.0  0.00 - 0.04 K/UL Final    DF 12/27/2021 AUTOMATED    Final    Manual    PLATELET COMMENTS 25/44/2578 Adequate    Final    RBC COMMENTS 12/27/2021     Final                    Value: Anisocytosis  1+      RBC COMMENTS 12/27/2021     Final                    Value:Microcytosis  1+      RBC COMMENTS 12/27/2021     Final                    Value:Poikilocytosis  1+      RBC COMMENTS 12/27/2021 SCHISTOCYTES, ELLIPTOCYTES & IVONNE CELLS NOTED   Final    Free Triiodothyronine (T3) 12/27/2021 1.6* 2.2 - 4.0 pg/mL Final    Vitamin B12 12/27/2021 500  211 - 911 pg/mL Final    T4, Free 12/27/2021 1.0  0.7 - 1.5 ng/dL Final    Hemoglobin A1c 12/27/2021 6.7* 4.2 - 5.6 % Final    Comment: (NOTE)  HbA1C Interpretive Ranges  <5.7              Normal  5.7 - 6.4         Consider Prediabetes  >6.5              Consider Diabetes      Est. average glucose 12/27/2021 146  mg/dL Final    Comment: (NOTE)  The eAG should be interpreted with patient characteristics in mind   since ethnicity, interindividual differences, red cell lifespan,   variation in rates of glycation, etc. may affect the validity of the   calculation.  LIPID PROFILE 12/27/2021      Final    Cholesterol, total 12/27/2021 121  <200 mg/dL Final    Triglyceride 12/27/2021 102  <150 mg/dL Final    Comment: The drugs N-acetylcysteine (NAC) and  Metamiszole have been found to cause falsely  low results in this chemical assay.  Please  be sure to submit blood samples obtained  BEFORE administration of either of these  drugs to assure correct results.  HDL Cholesterol 12/27/2021 52  40 - 60 mg/dL Final    LDL, calculated 12/27/2021 48.6  0 - 100 mg/dL Final    VLDL, calculated 12/27/2021 20.4  mg/dL Final    CHOL/HDL Ratio 12/27/2021 2.3  0 - 5.0   Final    Calcium 12/27/2021 8.5  8.5 - 10.1 mg/dL Final    PTH, Intact 12/27/2021 PENDING  pg/mL Incomplete    Vitamin D 25-Hydroxy 12/27/2021 12.1* 30 - 100 ng/mL Final    Comment: (NOTE)  Deficiency               <20 ng/mL  Insufficiency          20-30 ng/mL  Sufficient             ng/mL  Possible toxicity       >100 ng/mL    The Method used is Siemens Advia Centaur currently standardized to a   Center of Disease Control and Prevention (CDC) certified reference   [address]. Samples containing fluorescein dye can produce falsely   elevated values when tested with the ADVIA Centaur Vitamin D Assay. It is recommended that results in the toxic range, >100 ng/mL, be   retested 72 hours post fluorescein exposure.  Magnesium 12/27/2021 2.0  1.7 - 2.8 mg/dL Final    Phosphorus 12/27/2021 3.7  2.5 - 4.5 mg/dL Final    TSH 12/27/2021 1.50  0.35 - 6.20 uIU/mL Final    Comment:    Due to TSH heterogeneity, both structurally and degree of glycosylation, monoclonal antibodies used in the TSH assay may not accurately quantitate TSH.  Therefore, this result should be correlated with clinical findings as well as with other assessments of thyroid function, e.g., free T4, free T3.       Uric acid 12/27/2021 5.6  3.5 - 8.5 mg/dL Final    Sodium 12/27/2021 140  135 - 145 mmol/L Final    Potassium 12/27/2021 5.5* 3.2 - 5.1 mmol/L Final    Chloride 12/27/2021 109  94 - 111 mmol/L Final    CO2 12/27/2021 17* 21 - 33 mmol/L Final    Anion gap 12/27/2021 14  mmol/L Final    Glucose 12/27/2021 166* 70 - 110 mg/dL Final    BUN 12/27/2021 40* 9 - 21 mg/dL Final    Creatinine 12/27/2021 2.50* 0.8 - 1.50 mg/dL Final    BUN/Creatinine ratio 12/27/2021 16    Final    GFR est AA 12/27/2021 30  ml/min/1.73m2 Final    GFR est non-AA 12/27/2021 25  ml/min/1.73m2 Final    Comment: Estimated GFR is calculated using the IDMS-traceable Modification of Diet in Renal Disease (MDRD) Study equation, reported for both  Americans (GFRAA) and non- Americans (GFRNA), and normalized to 1.73m2 body surface area. The physician must decide which value applies to the patient. The MDRD study equation should only be used in individuals age 25 or older. It has not been validated for the following: pregnant women, patients with serious comorbid conditions, or on certain medications, or persons with extremes of body size, muscle mass, or nutritional status.  Calcium 12/27/2021 8.9  8.5 - 10.5 mg/dL Final    Bilirubin, total 12/27/2021 0.6  0.2 - 1.0 mg/dL Final    AST (SGOT) 12/27/2021 9* 17 - 74 U/L Final    ALT (SGPT) 12/27/2021 9  3 - 72 U/L Final    Alk. phosphatase 12/27/2021 131* 38 - 126 U/L Final    Protein, total 12/27/2021 7.5  6.1 - 8.4 g/dL Final    Albumin 12/27/2021 3.5  3.5 - 4.7 g/dL Final    Globulin 12/27/2021 4.0  g/dL Final    A-G Ratio 12/27/2021 0.9    Final   Office Visit on 12/27/2021   Component Date Value Ref Range Status    Hemoglobin A1c (POC) 12/27/2021 6.6  % Final         Assessment & Plan:     1. Type 2 diabetes mellitus with stage 4 chronic kidney disease, without long-term current use of insulin (HCC)  A1c 6.7. Continue glipizide. Creatinine at baseline at 2.5. Needs referral to nephrology. Start Solorzano Alexandru for chronic hyperkalemia, sodium bicarb for metabolic acidosis, and vitamin D for vitamin D deficiency. Recheck electrolytes next visit. - CBC WITH AUTOMATED DIFF  - METABOLIC PANEL, COMPREHENSIVE  - MAGNESIUM  - PHOSPHORUS  - URIC ACID  - MICROALBUMIN, UR, RAND W/ MICROALB/CREAT RATIO  - PTH INTACT  - VITAMIN D, 25 HYDROXY  - URINALYSIS W/MICROSCOPIC  - LIPID PANEL  - AMB POC HEMOGLOBIN A1C    2.  Subclinical hypothyroidism  TSH within normal limits  - TSH 3RD GENERATION  - T3, FREE  - T4, FREE    3. Moderate episode of recurrent major depressive disorder (HCC)  Uncontrolled. Start Zoloft. Follow-up in 2 weeks    4. Chronic obstructive pulmonary disease, unspecified COPD type ([address]  Controlled. Secondary to noncompliance. Continue Spiriva, Symbicort, Ventolin as needed    5. Tobacco use  10 minutes spent on tobacco cessation. Start nicotine patch      7. Lung nodules  Repeat CT chest  - CT CHEST WO CONT; Future    8. Abdominal aortic aneurysm (AAA) without rupture (HCC)  Last ultrasound showed a 3.5 x 3.5 cm in 2019. Repeat AAA ultrasound    9. Nephrolithiasis  Has had some flank pain. History of kidney stones. Obtain CT abdomen pelvis. - CT ABD PELV WO CONT; Future             I have discussed the diagnosis with the patient and the intended plan as seen in the above orders. The patient has received an after-visit summary and questions were answered concerning future plans. I have discussed medication side effects and warnings with the patient as well. I have reviewed the plan of care with the patient, accepted their input and they are in agreement with the treatment goals. Previous lab and imaging results were reviewed by me.        Bettye Juarez MD  December 28, 2021

## 2022-01-01 ENCOUNTER — HOSPITAL ENCOUNTER (OUTPATIENT)
Dept: CT IMAGING | Age: 82
Discharge: HOME | End: 2022-01-07
Attending: STUDENT IN AN ORGANIZED HEALTH CARE EDUCATION/TRAINING PROGRAM
Payer: COMMERCIAL

## 2022-01-01 ENCOUNTER — HOSPITAL ENCOUNTER (OUTPATIENT)
Dept: PET IMAGING | Age: 82
Discharge: HOME | End: 2022-01-27
Attending: STUDENT IN AN ORGANIZED HEALTH CARE EDUCATION/TRAINING PROGRAM
Payer: COMMERCIAL

## 2022-01-01 ENCOUNTER — OFFICE VISIT (OUTPATIENT)
Dept: NEPHROLOGY | Age: 82
End: 2022-01-01
Payer: COMMERCIAL

## 2022-01-01 ENCOUNTER — OFFICE VISIT (OUTPATIENT)
Dept: FAMILY MEDICINE CLINIC | Age: 82
End: 2022-01-01
Payer: COMMERCIAL

## 2022-01-01 ENCOUNTER — HOSPITAL ENCOUNTER (OUTPATIENT)
Dept: LAB | Age: 82
Discharge: HOME | End: 2022-02-14
Payer: COMMERCIAL

## 2022-01-01 ENCOUNTER — TELEPHONE (OUTPATIENT)
Dept: FAMILY MEDICINE CLINIC | Age: 82
End: 2022-01-01

## 2022-01-01 ENCOUNTER — OFFICE VISIT (OUTPATIENT)
Dept: SURGERY | Age: 82
End: 2022-01-01
Payer: COMMERCIAL

## 2022-01-01 ENCOUNTER — HOSPITAL ENCOUNTER (EMERGENCY)
Age: 82
Discharge: ANOTHER HOSPITAL | End: 2022-05-21
Attending: EMERGENCY MEDICINE
Payer: COMMERCIAL

## 2022-01-01 ENCOUNTER — HOSPITAL ENCOUNTER (OUTPATIENT)
Dept: LAB | Age: 82
Discharge: HOME | End: 2022-01-18
Payer: COMMERCIAL

## 2022-01-01 ENCOUNTER — APPOINTMENT (OUTPATIENT)
Dept: CT IMAGING | Age: 82
End: 2022-01-01
Attending: EMERGENCY MEDICINE
Payer: COMMERCIAL

## 2022-01-01 ENCOUNTER — APPOINTMENT (OUTPATIENT)
Dept: GENERAL RADIOLOGY | Age: 82
End: 2022-01-01
Attending: EMERGENCY MEDICINE
Payer: COMMERCIAL

## 2022-01-01 ENCOUNTER — HOSPITAL ENCOUNTER (EMERGENCY)
Age: 82
Discharge: ANOTHER HOSPITAL | End: 2022-06-27
Attending: EMERGENCY MEDICINE
Payer: COMMERCIAL

## 2022-01-01 ENCOUNTER — NURSE TRIAGE (OUTPATIENT)
Dept: OTHER | Facility: CLINIC | Age: 82
End: 2022-01-01

## 2022-01-01 ENCOUNTER — OFFICE VISIT (OUTPATIENT)
Dept: NEPHROLOGY | Age: 82
End: 2022-01-01

## 2022-01-01 ENCOUNTER — HOSPITAL ENCOUNTER (OUTPATIENT)
Dept: ULTRASOUND IMAGING | Age: 82
Discharge: HOME | End: 2022-02-10
Attending: INTERNAL MEDICINE
Payer: COMMERCIAL

## 2022-01-01 VITALS
HEART RATE: 67 BPM | DIASTOLIC BLOOD PRESSURE: 71 MMHG | WEIGHT: 145 LBS | SYSTOLIC BLOOD PRESSURE: 135 MMHG | HEIGHT: 68 IN | OXYGEN SATURATION: 100 % | BODY MASS INDEX: 21.98 KG/M2

## 2022-01-01 VITALS
DIASTOLIC BLOOD PRESSURE: 64 MMHG | RESPIRATION RATE: 21 BRPM | HEART RATE: 64 BPM | HEIGHT: 70 IN | WEIGHT: 190 LBS | OXYGEN SATURATION: 98 % | SYSTOLIC BLOOD PRESSURE: 110 MMHG | TEMPERATURE: 98.8 F | BODY MASS INDEX: 27.2 KG/M2

## 2022-01-01 VITALS
DIASTOLIC BLOOD PRESSURE: 56 MMHG | BODY MASS INDEX: 20.04 KG/M2 | HEART RATE: 67 BPM | OXYGEN SATURATION: 95 % | WEIGHT: 140 LBS | HEIGHT: 70 IN | RESPIRATION RATE: 15 BRPM | SYSTOLIC BLOOD PRESSURE: 108 MMHG | TEMPERATURE: 97.5 F

## 2022-01-01 VITALS
DIASTOLIC BLOOD PRESSURE: 76 MMHG | BODY MASS INDEX: 22.05 KG/M2 | WEIGHT: 154 LBS | OXYGEN SATURATION: 100 % | HEIGHT: 70 IN | SYSTOLIC BLOOD PRESSURE: 130 MMHG | HEART RATE: 70 BPM | TEMPERATURE: 96.9 F

## 2022-01-01 VITALS
BODY MASS INDEX: 22.05 KG/M2 | WEIGHT: 145 LBS | SYSTOLIC BLOOD PRESSURE: 135 MMHG | HEART RATE: 67 BPM | DIASTOLIC BLOOD PRESSURE: 71 MMHG

## 2022-01-01 VITALS
BODY MASS INDEX: 22.88 KG/M2 | HEART RATE: 70 BPM | OXYGEN SATURATION: 100 % | SYSTOLIC BLOOD PRESSURE: 145 MMHG | WEIGHT: 151 LBS | DIASTOLIC BLOOD PRESSURE: 72 MMHG | TEMPERATURE: 97.2 F | HEIGHT: 68 IN

## 2022-01-01 VITALS
OXYGEN SATURATION: 100 % | DIASTOLIC BLOOD PRESSURE: 65 MMHG | TEMPERATURE: 98.1 F | HEIGHT: 68 IN | WEIGHT: 147 LBS | SYSTOLIC BLOOD PRESSURE: 111 MMHG | HEART RATE: 71 BPM | BODY MASS INDEX: 22.28 KG/M2

## 2022-01-01 VITALS
BODY MASS INDEX: 21.82 KG/M2 | HEART RATE: 66 BPM | HEIGHT: 68 IN | SYSTOLIC BLOOD PRESSURE: 127 MMHG | DIASTOLIC BLOOD PRESSURE: 61 MMHG | OXYGEN SATURATION: 100 % | WEIGHT: 144 LBS

## 2022-01-01 DIAGNOSIS — R19.09 ABDOMINAL MASS OF OTHER SITE: Primary | ICD-10-CM

## 2022-01-01 DIAGNOSIS — R31.29 OTHER MICROSCOPIC HEMATURIA: ICD-10-CM

## 2022-01-01 DIAGNOSIS — N18.4 CKD (CHRONIC KIDNEY DISEASE), STAGE IV (HCC): ICD-10-CM

## 2022-01-01 DIAGNOSIS — R65.21 SEPTIC SHOCK (HCC): ICD-10-CM

## 2022-01-01 DIAGNOSIS — N32.0 BLADDER OUTLET OBSTRUCTION: ICD-10-CM

## 2022-01-01 DIAGNOSIS — E78.49 OTHER HYPERLIPIDEMIA: ICD-10-CM

## 2022-01-01 DIAGNOSIS — N40.1 BENIGN PROSTATIC HYPERPLASIA WITH LOWER URINARY TRACT SYMPTOMS, SYMPTOM DETAILS UNSPECIFIED: ICD-10-CM

## 2022-01-01 DIAGNOSIS — A41.9 SEPTIC SHOCK (HCC): ICD-10-CM

## 2022-01-01 DIAGNOSIS — E11.42 DIABETIC POLYNEUROPATHY ASSOCIATED WITH TYPE 2 DIABETES MELLITUS (HCC): ICD-10-CM

## 2022-01-01 DIAGNOSIS — R59.0 LYMPHADENOPATHY, PERIAORTIC: ICD-10-CM

## 2022-01-01 DIAGNOSIS — N20.0 NEPHROLITHIASIS: ICD-10-CM

## 2022-01-01 DIAGNOSIS — E11.22 TYPE 2 DIABETES MELLITUS WITH STAGE 4 CHRONIC KIDNEY DISEASE, WITHOUT LONG-TERM CURRENT USE OF INSULIN (HCC): ICD-10-CM

## 2022-01-01 DIAGNOSIS — N18.4 CKD (CHRONIC KIDNEY DISEASE), STAGE IV (HCC): Primary | ICD-10-CM

## 2022-01-01 DIAGNOSIS — J44.9 CHRONIC OBSTRUCTIVE PULMONARY DISEASE, UNSPECIFIED COPD TYPE (HCC): ICD-10-CM

## 2022-01-01 DIAGNOSIS — C64.2 RENAL CELL CARCINOMA OF LEFT KIDNEY (HCC): Primary | ICD-10-CM

## 2022-01-01 DIAGNOSIS — K63.9 MURAL THICKENING OF COLON: ICD-10-CM

## 2022-01-01 DIAGNOSIS — J18.9 PNEUMONIA DUE TO INFECTIOUS ORGANISM, UNSPECIFIED LATERALITY, UNSPECIFIED PART OF LUNG: Primary | ICD-10-CM

## 2022-01-01 DIAGNOSIS — N39.0 ACUTE UTI: ICD-10-CM

## 2022-01-01 DIAGNOSIS — I50.20 HFREF (HEART FAILURE WITH REDUCED EJECTION FRACTION) (HCC): ICD-10-CM

## 2022-01-01 DIAGNOSIS — R91.8 PULMONARY NODULES: ICD-10-CM

## 2022-01-01 DIAGNOSIS — Z09 HOSPITAL DISCHARGE FOLLOW-UP: ICD-10-CM

## 2022-01-01 DIAGNOSIS — N18.4 TYPE 2 DIABETES MELLITUS WITH STAGE 4 CHRONIC KIDNEY DISEASE, WITHOUT LONG-TERM CURRENT USE OF INSULIN (HCC): ICD-10-CM

## 2022-01-01 DIAGNOSIS — R59.0 LYMPHADENOPATHY, PERIAORTIC: Primary | ICD-10-CM

## 2022-01-01 DIAGNOSIS — N17.9 ACUTE RENAL FAILURE SUPERIMPOSED ON CHRONIC KIDNEY DISEASE, UNSPECIFIED CKD STAGE, UNSPECIFIED ACUTE RENAL FAILURE TYPE (HCC): ICD-10-CM

## 2022-01-01 DIAGNOSIS — A41.9 SEPTIC SHOCK (HCC): Primary | ICD-10-CM

## 2022-01-01 DIAGNOSIS — F33.1 MODERATE EPISODE OF RECURRENT MAJOR DEPRESSIVE DISORDER (HCC): ICD-10-CM

## 2022-01-01 DIAGNOSIS — D50.9 IRON DEFICIENCY ANEMIA, UNSPECIFIED IRON DEFICIENCY ANEMIA TYPE: ICD-10-CM

## 2022-01-01 DIAGNOSIS — N18.4 CKD (CHRONIC KIDNEY DISEASE) STAGE 4, GFR 15-29 ML/MIN (HCC): ICD-10-CM

## 2022-01-01 DIAGNOSIS — I71.40 ABDOMINAL AORTIC ANEURYSM (AAA) WITHOUT RUPTURE: ICD-10-CM

## 2022-01-01 DIAGNOSIS — D64.9 SEVERE ANEMIA: ICD-10-CM

## 2022-01-01 DIAGNOSIS — N18.4 CKD (CHRONIC KIDNEY DISEASE) STAGE 4, GFR 15-29 ML/MIN (HCC): Primary | ICD-10-CM

## 2022-01-01 DIAGNOSIS — R65.21 SEPTIC SHOCK (HCC): Primary | ICD-10-CM

## 2022-01-01 DIAGNOSIS — N30.01 ACUTE CYSTITIS WITH HEMATURIA: ICD-10-CM

## 2022-01-01 DIAGNOSIS — R91.8 LUNG NODULES: ICD-10-CM

## 2022-01-01 DIAGNOSIS — C64.2 RENAL CELL CARCINOMA OF LEFT KIDNEY (HCC): ICD-10-CM

## 2022-01-01 DIAGNOSIS — R59.0 PERIAORTIC LYMPHADENOPATHY: Primary | ICD-10-CM

## 2022-01-01 DIAGNOSIS — N18.9 ACUTE RENAL FAILURE SUPERIMPOSED ON CHRONIC KIDNEY DISEASE, UNSPECIFIED CKD STAGE, UNSPECIFIED ACUTE RENAL FAILURE TYPE (HCC): ICD-10-CM

## 2022-01-01 LAB
25(OH)D3 SERPL-MCNC: 9 NG/ML (ref 30–100)
ABO + RH BLD: NORMAL
ALBUMIN SERPL-MCNC: 1.7 G/DL (ref 3.4–5)
ALBUMIN SERPL-MCNC: 1.8 G/DL (ref 3.4–5)
ALBUMIN SERPL-MCNC: 2.9 G/DL (ref 3.5–4.7)
ALBUMIN SERPL-MCNC: 3.3 G/DL (ref 3.5–4.7)
ALBUMIN/GLOB SERPL: 0.3 {RATIO} (ref 0.8–1.7)
ALBUMIN/GLOB SERPL: 0.4 {RATIO} (ref 0.8–1.7)
ALBUMIN/GLOB SERPL: 0.8 {RATIO}
ALP SERPL-CCNC: 103 U/L (ref 45–117)
ALP SERPL-CCNC: 109 U/L (ref 38–126)
ALP SERPL-CCNC: 121 U/L (ref 45–117)
ALT SERPL-CCNC: 13 U/L (ref 3–72)
ALT SERPL-CCNC: 17 U/L (ref 16–61)
ALT SERPL-CCNC: 30 U/L (ref 16–61)
ANION GAP SERPL CALC-SCNC: 11 MMOL/L
ANION GAP SERPL CALC-SCNC: 11 MMOL/L
ANION GAP SERPL CALC-SCNC: 14 MMOL/L (ref 3–18)
ANION GAP SERPL CALC-SCNC: 9 MMOL/L (ref 3–18)
APPEARANCE UR: ABNORMAL
APPEARANCE UR: ABNORMAL
AST SERPL W P-5'-P-CCNC: 13 U/L (ref 17–74)
AST SERPL W P-5'-P-CCNC: 19 U/L (ref 10–38)
AST SERPL W P-5'-P-CCNC: 20 U/L (ref 10–38)
ATRIAL RATE: 0 BPM
ATRIAL RATE: 69 BPM
BACTERIA SPEC CULT: ABNORMAL
BACTERIA SPEC CULT: NORMAL
BACTERIA URNS QL MICRO: ABNORMAL /HPF
BACTERIA URNS QL MICRO: ABNORMAL /HPF
BASOPHILS # BLD: 0 K/UL (ref 0–0.1)
BASOPHILS # BLD: 0 K/UL (ref 0–0.1)
BASOPHILS # BLD: 0.1 K/UL (ref 0–0.1)
BASOPHILS NFR BLD: 0 % (ref 0–2)
BASOPHILS NFR BLD: 0 % (ref 0–2)
BASOPHILS NFR BLD: 1 % (ref 0–2)
BILIRUB DIRECT SERPL-MCNC: 0.2 MG/DL (ref 0–0.2)
BILIRUB SERPL-MCNC: 0.3 MG/DL (ref 0.2–1)
BILIRUB SERPL-MCNC: 0.4 MG/DL (ref 0.2–1)
BILIRUB SERPL-MCNC: 0.7 MG/DL (ref 0.2–1)
BILIRUB UR QL STRIP: NEGATIVE
BILIRUB UR QL: NEGATIVE
BILIRUB UR QL: NEGATIVE
BLD PROD TYP BPU: NORMAL
BLOOD GROUP ANTIBODIES SERPL: NEGATIVE
BPU ID: NORMAL
BUN SERPL-MCNC: 37 MG/DL (ref 9–21)
BUN SERPL-MCNC: 49 MG/DL (ref 9–21)
BUN SERPL-MCNC: 60 MG/DL (ref 7–18)
BUN SERPL-MCNC: 67 MG/DL (ref 7–18)
BUN/CREAT SERPL: 15
BUN/CREAT SERPL: 17 (ref 12–20)
BUN/CREAT SERPL: 18 (ref 12–20)
BUN/CREAT SERPL: 20
CA-I BLD-MCNC: 7.4 MG/DL (ref 8.5–10.1)
CA-I BLD-MCNC: 7.7 MG/DL (ref 8.5–10.1)
CA-I BLD-MCNC: 8.1 MG/DL (ref 8.5–10.5)
CA-I BLD-MCNC: 8.5 MG/DL (ref 8.5–10.5)
CA-I BLD-MCNC: 8.6 MG/DL (ref 8.5–10.1)
CALCULATED P AXIS, ECG09: -125 DEGREES
CALCULATED R AXIS, ECG10: -81 DEGREES
CALCULATED R AXIS, ECG10: -90 DEGREES
CALCULATED T AXIS, ECG11: 59 DEGREES
CALCULATED T AXIS, ECG11: 79 DEGREES
CHLORIDE SERPL-SCNC: 106 MMOL/L (ref 100–111)
CHLORIDE SERPL-SCNC: 108 MMOL/L (ref 94–111)
CHLORIDE SERPL-SCNC: 109 MMOL/L (ref 94–111)
CHLORIDE SERPL-SCNC: 98 MMOL/L (ref 100–111)
CO2 SERPL-SCNC: 19 MMOL/L (ref 21–33)
CO2 SERPL-SCNC: 20 MMOL/L (ref 21–32)
CO2 SERPL-SCNC: 20 MMOL/L (ref 21–32)
CO2 SERPL-SCNC: 23 MMOL/L (ref 21–33)
COLLECT DATE STL: NORMAL
COLONY COUNT,CNT: ABNORMAL
COLOR UR: YELLOW
COLOR UR: YELLOW
COVID-19 RAPID TEST, COVR: NOT DETECTED
CREAT SERPL-MCNC: 2.4 MG/DL (ref 0.8–1.5)
CREAT SERPL-MCNC: 2.5 MG/DL (ref 0.8–1.5)
CREAT SERPL-MCNC: 3.35 MG/DL (ref 0.6–1.3)
CREAT SERPL-MCNC: 4.05 MG/DL (ref 0.6–1.3)
CREAT UR-MCNC: 75.61 MG/DL (ref 22–392)
CROSSMATCH RESULT,%XM: NORMAL
DIAGNOSIS, 93000: NORMAL
DIAGNOSIS, 93000: NORMAL
DIFFERENTIAL METHOD BLD: ABNORMAL
EOSINOPHIL # BLD: 0 K/UL (ref 0–0.4)
EOSINOPHIL # BLD: 0.1 K/UL (ref 0–0.4)
EOSINOPHIL # BLD: 0.1 K/UL (ref 0–0.4)
EOSINOPHIL NFR BLD: 0 % (ref 0–5)
EOSINOPHIL NFR BLD: 1 % (ref 0–5)
EOSINOPHIL NFR BLD: 1 % (ref 0–5)
EPITH CASTS URNS QL MICRO: ABNORMAL /LPF (ref 0–20)
EPITH CASTS URNS QL MICRO: ABNORMAL /LPF (ref 0–20)
ERYTHROCYTE [DISTWIDTH] IN BLOOD BY AUTOMATED COUNT: 16.5 % (ref 11.6–14.5)
ERYTHROCYTE [DISTWIDTH] IN BLOOD BY AUTOMATED COUNT: 16.9 % (ref 11.6–14.5)
ERYTHROCYTE [DISTWIDTH] IN BLOOD BY AUTOMATED COUNT: 17.7 % (ref 11.6–14.5)
ERYTHROCYTE [DISTWIDTH] IN BLOOD BY AUTOMATED COUNT: 20 % (ref 11.6–14.5)
FERRITIN SERPL-MCNC: 54 NG/ML (ref 8–388)
FLUAV AG NPH QL IA: NEGATIVE
FLUBV AG NOSE QL IA: NEGATIVE
GLOBULIN SER CALC-MCNC: 3.8 G/DL
GLOBULIN SER CALC-MCNC: 5 G/DL (ref 2–4)
GLOBULIN SER CALC-MCNC: 5.1 G/DL (ref 2–4)
GLUCOSE SERPL-MCNC: 154 MG/DL (ref 70–110)
GLUCOSE SERPL-MCNC: 260 MG/DL (ref 70–110)
GLUCOSE SERPL-MCNC: 75 MG/DL (ref 74–99)
GLUCOSE SERPL-MCNC: 96 MG/DL (ref 74–99)
GLUCOSE UR STRIP.AUTO-MCNC: NEGATIVE MG/DL
GLUCOSE UR STRIP.AUTO-MCNC: NEGATIVE MG/DL
GLUCOSE UR-MCNC: NEGATIVE MG/DL
HCT VFR BLD AUTO: 20.8 % (ref 36–48)
HCT VFR BLD AUTO: 23.5 % (ref 36–48)
HCT VFR BLD AUTO: 31.3 % (ref 36–48)
HCT VFR BLD AUTO: 31.9 % (ref 36–48)
HEMOCCULT SP1 STL QL: POSITIVE
HGB BLD-MCNC: 6 G/DL (ref 13–16)
HGB BLD-MCNC: 6.8 G/DL (ref 13–16)
HGB BLD-MCNC: 9.1 G/DL (ref 13–16)
HGB BLD-MCNC: 9.2 G/DL (ref 13–16)
HGB UR QL STRIP: ABNORMAL
HGB UR QL STRIP: ABNORMAL
HYALINE CASTS URNS QL MICRO: ABNORMAL /LPF
IMM GRANULOCYTES # BLD AUTO: 0 K/UL
IMM GRANULOCYTES # BLD AUTO: 0 K/UL (ref 0–0.04)
IMM GRANULOCYTES # BLD AUTO: 1.1 K/UL (ref 0–0.04)
IMM GRANULOCYTES NFR BLD AUTO: 0 %
IMM GRANULOCYTES NFR BLD AUTO: 0 % (ref 0–0.5)
IMM GRANULOCYTES NFR BLD AUTO: 4 % (ref 0–0.5)
IRON SATN MFR SERPL: 6 % (ref 20–50)
IRON SERPL-MCNC: 19 UG/DL (ref 50–175)
KETONES P FAST UR STRIP-MCNC: NEGATIVE MG/DL
KETONES UR QL STRIP.AUTO: NEGATIVE MG/DL
KETONES UR QL STRIP.AUTO: NEGATIVE MG/DL
LACTATE SERPL-SCNC: 1.7 MMOL/L (ref 0.4–2)
LACTATE SERPL-SCNC: 1.9 MMOL/L (ref 0.4–2)
LACTATE SERPL-SCNC: NORMAL MMOL/L (ref 0.4–2)
LEUKOCYTE ESTERASE UR QL STRIP.AUTO: ABNORMAL
LEUKOCYTE ESTERASE UR QL STRIP.AUTO: ABNORMAL
LYMPHOCYTES # BLD: 0.4 K/UL (ref 0.9–3.6)
LYMPHOCYTES # BLD: 0.5 K/UL (ref 0.9–3.6)
LYMPHOCYTES # BLD: 1.3 K/UL (ref 0.9–3.6)
LYMPHOCYTES NFR BLD: 15 % (ref 21–52)
LYMPHOCYTES NFR BLD: 2 % (ref 21–52)
LYMPHOCYTES NFR BLD: 3 % (ref 21–52)
MAGNESIUM SERPL-MCNC: 1.8 MG/DL (ref 1.7–2.8)
MCH RBC QN AUTO: 22.6 PG (ref 24–34)
MCH RBC QN AUTO: 23.9 PG (ref 24–34)
MCH RBC QN AUTO: 25.3 PG (ref 24–34)
MCH RBC QN AUTO: 25.4 PG (ref 24–34)
MCHC RBC AUTO-ENTMCNC: 28.8 G/DL (ref 31–37)
MCHC RBC AUTO-ENTMCNC: 28.8 G/DL (ref 31–37)
MCHC RBC AUTO-ENTMCNC: 28.9 G/DL (ref 31–37)
MCHC RBC AUTO-ENTMCNC: 29.1 G/DL (ref 31–37)
MCV RBC AUTO: 78.2 FL (ref 78–100)
MCV RBC AUTO: 82.7 FL (ref 78–100)
MCV RBC AUTO: 87.2 FL (ref 78–100)
MCV RBC AUTO: 88.1 FL (ref 78–100)
MONOCYTES # BLD: 0.9 K/UL (ref 0.05–1.2)
MONOCYTES # BLD: 1 K/UL (ref 0.05–1.2)
MONOCYTES # BLD: 1.8 K/UL (ref 0.05–1.2)
MONOCYTES NFR BLD: 11 % (ref 3–10)
MONOCYTES NFR BLD: 7 % (ref 3–10)
MONOCYTES NFR BLD: 7 % (ref 3–10)
MUCOUS THREADS URNS QL MICRO: ABNORMAL /LPF
NEUTS SEG # BLD: 12 K/UL (ref 1.8–8)
NEUTS SEG # BLD: 23.8 K/UL (ref 1.8–8)
NEUTS SEG # BLD: 6.6 K/UL (ref 1.8–8)
NEUTS SEG NFR BLD: 72 % (ref 40–73)
NEUTS SEG NFR BLD: 87 % (ref 40–73)
NEUTS SEG NFR BLD: 89 % (ref 40–73)
NITRITE UR QL STRIP.AUTO: NEGATIVE
NITRITE UR QL STRIP.AUTO: POSITIVE
NRBC # BLD: 0 K/UL (ref 0–0.01)
NRBC BLD-RTO: 0 PER 100 WBC
P-R INTERVAL, ECG05: 154 MS
P-R INTERVAL, ECG05: 52 MS
PH UR STRIP: 5.5 [PH] (ref 5–8)
PH UR STRIP: 6 [PH] (ref 4.6–8)
PH UR STRIP: 6.5 [PH] (ref 5–8)
PHOSPHATE SERPL-MCNC: 3.9 MG/DL (ref 2.5–4.5)
PHOSPHATE SERPL-MCNC: 4.8 MG/DL (ref 2.5–4.5)
PLATELET # BLD AUTO: 346 K/UL (ref 135–420)
PLATELET # BLD AUTO: 355 K/UL (ref 135–420)
PLATELET # BLD AUTO: 409 K/UL (ref 135–420)
PLATELET # BLD AUTO: 467 K/UL (ref 135–420)
PMV BLD AUTO: 10.3 FL (ref 9.2–11.8)
PMV BLD AUTO: 10.6 FL (ref 9.2–11.8)
PMV BLD AUTO: 10.6 FL (ref 9.2–11.8)
PMV BLD AUTO: 10.8 FL (ref 9.2–11.8)
POTASSIUM SERPL-SCNC: 4.6 MMOL/L (ref 3.5–5.5)
POTASSIUM SERPL-SCNC: 5.2 MMOL/L (ref 3.5–5.5)
POTASSIUM SERPL-SCNC: 5.3 MMOL/L (ref 3.2–5.1)
POTASSIUM SERPL-SCNC: 5.4 MMOL/L (ref 3.2–5.1)
PROT SERPL-MCNC: 6.7 G/DL (ref 6.1–8.4)
PROT SERPL-MCNC: 6.7 G/DL (ref 6.4–8.2)
PROT SERPL-MCNC: 6.9 G/DL (ref 6.4–8.2)
PROT UR QL STRIP: NORMAL
PROT UR STRIP-MCNC: 100 MG/DL
PROT UR STRIP-MCNC: 300 MG/DL
PROT UR-MCNC: 148 MG/DL (ref 6–10)
PROT/CREAT UR-RTO: 2
PSA SERPL-MCNC: 0 NG/ML (ref 0–4)
PTH-INTACT SERPL-MCNC: 297.8 PG/ML (ref 18.4–88)
Q-T INTERVAL, ECG07: 441 MS
Q-T INTERVAL, ECG07: 453 MS
QRS DURATION, ECG06: 159 MS
QRS DURATION, ECG06: 160 MS
QTC CALCULATION (BEZET), ECG08: 473 MS
QTC CALCULATION (BEZET), ECG08: 765 MS
RBC # BLD AUTO: 2.66 M/UL (ref 4.35–5.65)
RBC # BLD AUTO: 2.84 M/UL (ref 4.35–5.65)
RBC # BLD AUTO: 3.59 M/UL (ref 4.35–5.65)
RBC # BLD AUTO: 3.62 M/UL (ref 4.35–5.65)
RBC #/AREA URNS HPF: ABNORMAL /HPF (ref 0–2)
RBC #/AREA URNS HPF: ABNORMAL /HPF (ref 0–2)
RBC MORPH BLD: ABNORMAL
SODIUM SERPL-SCNC: 132 MMOL/L (ref 136–145)
SODIUM SERPL-SCNC: 135 MMOL/L (ref 136–145)
SODIUM SERPL-SCNC: 139 MMOL/L (ref 135–145)
SODIUM SERPL-SCNC: 142 MMOL/L (ref 135–145)
SP GR UR REFRACTOMETRY: 1.01 (ref 1–1.03)
SP GR UR REFRACTOMETRY: 1.02 (ref 1–1.03)
SP GR UR STRIP: 1.02 (ref 1–1.03)
SPECIAL REQUESTS,SREQ: ABNORMAL
SPECIAL REQUESTS,SREQ: NORMAL
SPECIMEN EXP DATE BLD: NORMAL
STATUS OF UNIT,%ST: NORMAL
TIBC SERPL-MCNC: 302 UG/DL (ref 250–450)
TRANSFUSION STATUS PATIENT QL: NORMAL
TROPONIN-HIGH SENSITIVITY: 25 NG/L (ref 0–78)
UA UROBILINOGEN AMB POC: NORMAL (ref 0.2–1)
UNIT DIVISION, %UDIV: 0
URINALYSIS CLARITY POC: CLEAR
URINALYSIS COLOR POC: COLORLESS
URINE BLOOD POC: NORMAL
URINE LEUKOCYTES POC: NORMAL
URINE NITRITES POC: NEGATIVE
UROBILINOGEN UR QL STRIP.AUTO: 0.2 EU/DL (ref 0.2–1)
UROBILINOGEN UR QL STRIP.AUTO: 1 EU/DL (ref 0.2–1)
VENTRICULAR RATE, ECG03: 171 BPM
VENTRICULAR RATE, ECG03: 69 BPM
WBC # BLD AUTO: 13.4 K/UL (ref 4.6–13.2)
WBC # BLD AUTO: 27.3 K/UL (ref 4.6–13.2)
WBC # BLD AUTO: 8.3 K/UL (ref 4.6–13.2)
WBC # BLD AUTO: 9 K/UL (ref 4.6–13.2)
WBC URNS QL MICRO: >100 /HPF (ref 0–4)
WBC URNS QL MICRO: ABNORMAL /HPF (ref 0–4)

## 2022-01-01 PROCEDURE — 74011250636 HC RX REV CODE- 250/636: Performed by: EMERGENCY MEDICINE

## 2022-01-01 PROCEDURE — 83605 ASSAY OF LACTIC ACID: CPT

## 2022-01-01 PROCEDURE — 80048 BASIC METABOLIC PNL TOTAL CA: CPT

## 2022-01-01 PROCEDURE — P9016 RBC LEUKOCYTES REDUCED: HCPCS

## 2022-01-01 PROCEDURE — 84153 ASSAY OF PSA TOTAL: CPT

## 2022-01-01 PROCEDURE — 99215 OFFICE O/P EST HI 40 MIN: CPT | Performed by: STUDENT IN AN ORGANIZED HEALTH CARE EDUCATION/TRAINING PROGRAM

## 2022-01-01 PROCEDURE — 1123F ACP DISCUSS/DSCN MKR DOCD: CPT | Performed by: STUDENT IN AN ORGANIZED HEALTH CARE EDUCATION/TRAINING PROGRAM

## 2022-01-01 PROCEDURE — 87086 URINE CULTURE/COLONY COUNT: CPT

## 2022-01-01 PROCEDURE — 76770 US EXAM ABDO BACK WALL COMP: CPT

## 2022-01-01 PROCEDURE — 85025 COMPLETE CBC W/AUTO DIFF WBC: CPT

## 2022-01-01 PROCEDURE — 84484 ASSAY OF TROPONIN QUANT: CPT

## 2022-01-01 PROCEDURE — 74011000258 HC RX REV CODE- 258: Performed by: EMERGENCY MEDICINE

## 2022-01-01 PROCEDURE — 96361 HYDRATE IV INFUSION ADD-ON: CPT

## 2022-01-01 PROCEDURE — 74011000250 HC RX REV CODE- 250: Performed by: STUDENT IN AN ORGANIZED HEALTH CARE EDUCATION/TRAINING PROGRAM

## 2022-01-01 PROCEDURE — 36415 COLL VENOUS BLD VENIPUNCTURE: CPT

## 2022-01-01 PROCEDURE — 82272 OCCULT BLD FECES 1-3 TESTS: CPT

## 2022-01-01 PROCEDURE — 99285 EMERGENCY DEPT VISIT HI MDM: CPT

## 2022-01-01 PROCEDURE — 87804 INFLUENZA ASSAY W/OPTIC: CPT

## 2022-01-01 PROCEDURE — 87635 SARS-COV-2 COVID-19 AMP PRB: CPT

## 2022-01-01 PROCEDURE — A9552 F18 FDG: HCPCS

## 2022-01-01 PROCEDURE — 74176 CT ABD & PELVIS W/O CONTRAST: CPT

## 2022-01-01 PROCEDURE — 96365 THER/PROPH/DIAG IV INF INIT: CPT

## 2022-01-01 PROCEDURE — 71045 X-RAY EXAM CHEST 1 VIEW: CPT

## 2022-01-01 PROCEDURE — 99204 OFFICE O/P NEW MOD 45 MIN: CPT | Performed by: INTERNAL MEDICINE

## 2022-01-01 PROCEDURE — 99214 OFFICE O/P EST MOD 30 MIN: CPT | Performed by: INTERNAL MEDICINE

## 2022-01-01 PROCEDURE — 87040 BLOOD CULTURE FOR BACTERIA: CPT

## 2022-01-01 PROCEDURE — 84156 ASSAY OF PROTEIN URINE: CPT

## 2022-01-01 PROCEDURE — 99214 OFFICE O/P EST MOD 30 MIN: CPT | Performed by: STUDENT IN AN ORGANIZED HEALTH CARE EDUCATION/TRAINING PROGRAM

## 2022-01-01 PROCEDURE — 93005 ELECTROCARDIOGRAM TRACING: CPT

## 2022-01-01 PROCEDURE — 83540 ASSAY OF IRON: CPT

## 2022-01-01 PROCEDURE — 86900 BLOOD TYPING SEROLOGIC ABO: CPT

## 2022-01-01 PROCEDURE — 82728 ASSAY OF FERRITIN: CPT

## 2022-01-01 PROCEDURE — 96374 THER/PROPH/DIAG INJ IV PUSH: CPT

## 2022-01-01 PROCEDURE — 36430 TRANSFUSION BLD/BLD COMPNT: CPT

## 2022-01-01 PROCEDURE — 96376 TX/PRO/DX INJ SAME DRUG ADON: CPT

## 2022-01-01 PROCEDURE — 51702 INSERT TEMP BLADDER CATH: CPT

## 2022-01-01 PROCEDURE — 96366 THER/PROPH/DIAG IV INF ADDON: CPT

## 2022-01-01 PROCEDURE — 75810000455 HC PLCMT CENT VENOUS CATH LVL 2 5182

## 2022-01-01 PROCEDURE — 71250 CT THORAX DX C-: CPT

## 2022-01-01 PROCEDURE — 80053 COMPREHEN METABOLIC PANEL: CPT

## 2022-01-01 PROCEDURE — 85027 COMPLETE CBC AUTOMATED: CPT

## 2022-01-01 PROCEDURE — 87077 CULTURE AEROBIC IDENTIFY: CPT

## 2022-01-01 PROCEDURE — 80076 HEPATIC FUNCTION PANEL: CPT

## 2022-01-01 PROCEDURE — 82306 VITAMIN D 25 HYDROXY: CPT

## 2022-01-01 PROCEDURE — 87186 SC STD MICRODIL/AGAR DIL: CPT

## 2022-01-01 PROCEDURE — 96375 TX/PRO/DX INJ NEW DRUG ADDON: CPT

## 2022-01-01 PROCEDURE — 96367 TX/PROPH/DG ADDL SEQ IV INF: CPT

## 2022-01-01 PROCEDURE — 81001 URINALYSIS AUTO W/SCOPE: CPT

## 2022-01-01 PROCEDURE — 74011000250 HC RX REV CODE- 250: Performed by: EMERGENCY MEDICINE

## 2022-01-01 PROCEDURE — 81001 URINALYSIS AUTO W/SCOPE: CPT | Performed by: STUDENT IN AN ORGANIZED HEALTH CARE EDUCATION/TRAINING PROGRAM

## 2022-01-01 PROCEDURE — 84100 ASSAY OF PHOSPHORUS: CPT

## 2022-01-01 PROCEDURE — 99203 OFFICE O/P NEW LOW 30 MIN: CPT | Performed by: SURGERY

## 2022-01-01 PROCEDURE — 83735 ASSAY OF MAGNESIUM: CPT

## 2022-01-01 PROCEDURE — 83970 ASSAY OF PARATHORMONE: CPT

## 2022-01-01 PROCEDURE — 80069 RENAL FUNCTION PANEL: CPT

## 2022-01-01 RX ORDER — ONDANSETRON 2 MG/ML
4 INJECTION INTRAMUSCULAR; INTRAVENOUS
Status: COMPLETED | OUTPATIENT
Start: 2022-01-01 | End: 2022-01-01

## 2022-01-01 RX ORDER — OMEPRAZOLE 40 MG/1
40 CAPSULE, DELAYED RELEASE ORAL DAILY
COMMUNITY

## 2022-01-01 RX ORDER — GLIPIZIDE 5 MG/1
5 TABLET, FILM COATED, EXTENDED RELEASE ORAL DAILY
Qty: 90 TABLET | Refills: 1 | Status: SHIPPED | OUTPATIENT
Start: 2022-01-01 | End: 2022-01-01

## 2022-01-01 RX ORDER — PRAVASTATIN SODIUM 40 MG/1
40 TABLET ORAL DAILY
Qty: 90 TABLET | Refills: 1 | Status: SHIPPED | OUTPATIENT
Start: 2022-01-01

## 2022-01-01 RX ORDER — ERGOCALCIFEROL 1.25 MG/1
50000 CAPSULE ORAL
Qty: 16 CAPSULE | Refills: 0 | Status: SHIPPED | OUTPATIENT
Start: 2022-01-01 | End: 2022-01-01

## 2022-01-01 RX ORDER — HYDROMORPHONE HYDROCHLORIDE 1 MG/ML
1 INJECTION, SOLUTION INTRAMUSCULAR; INTRAVENOUS; SUBCUTANEOUS ONCE
Status: COMPLETED | OUTPATIENT
Start: 2022-01-01 | End: 2022-01-01

## 2022-01-01 RX ORDER — TAMSULOSIN HYDROCHLORIDE 0.4 MG/1
0.4 CAPSULE ORAL DAILY
Qty: 90 CAPSULE | Refills: 1 | Status: SHIPPED | OUTPATIENT
Start: 2022-01-01

## 2022-01-01 RX ORDER — FLUDEOXYGLUCOSE F18 300 MCI/ML
10 INJECTION INTRAVENOUS ONCE
Status: COMPLETED | OUTPATIENT
Start: 2022-01-01 | End: 2022-01-01

## 2022-01-01 RX ORDER — PARICALCITOL 1 UG/1
CAPSULE, LIQUID FILLED ORAL
Qty: 90 CAPSULE | Refills: 1 | Status: SHIPPED | OUTPATIENT
Start: 2022-01-01

## 2022-01-01 RX ORDER — GLIPIZIDE 5 MG/1
TABLET, FILM COATED, EXTENDED RELEASE ORAL
Qty: 90 TABLET | Refills: 1 | Status: SHIPPED | OUTPATIENT
Start: 2022-01-01

## 2022-01-01 RX ORDER — SERTRALINE HYDROCHLORIDE 50 MG/1
50 TABLET, FILM COATED ORAL DAILY
Qty: 30 TABLET | Refills: 0 | Status: SHIPPED | OUTPATIENT
Start: 2022-01-01 | End: 2022-01-01 | Stop reason: SDUPTHER

## 2022-01-01 RX ORDER — FUROSEMIDE 20 MG/1
20 TABLET ORAL 2 TIMES DAILY
COMMUNITY
End: 2022-01-01 | Stop reason: SDUPTHER

## 2022-01-01 RX ORDER — GABAPENTIN 100 MG/1
CAPSULE ORAL
Qty: 90 CAPSULE | Refills: 0 | Status: SHIPPED | OUTPATIENT
Start: 2022-01-01

## 2022-01-01 RX ORDER — PARICALCITOL 1 UG/1
1 CAPSULE, LIQUID FILLED ORAL DAILY
Qty: 30 CAPSULE | Refills: 1 | Status: SHIPPED | OUTPATIENT
Start: 2022-01-01 | End: 2022-01-01

## 2022-01-01 RX ORDER — GABAPENTIN 100 MG/1
100 CAPSULE ORAL 3 TIMES DAILY
Qty: 90 CAPSULE | Refills: 0 | Status: SHIPPED | OUTPATIENT
Start: 2022-01-01 | End: 2022-01-01

## 2022-01-01 RX ORDER — FERROUS SULFATE 325(65) MG
325 TABLET, DELAYED RELEASE (ENTERIC COATED) ORAL
Qty: 270 TABLET | Refills: 1 | Status: SHIPPED | OUTPATIENT
Start: 2022-01-01

## 2022-01-01 RX ORDER — HYDROMORPHONE HYDROCHLORIDE 1 MG/ML
0.5 INJECTION, SOLUTION INTRAMUSCULAR; INTRAVENOUS; SUBCUTANEOUS ONCE
Status: COMPLETED | OUTPATIENT
Start: 2022-01-01 | End: 2022-01-01

## 2022-01-01 RX ORDER — LEVOFLOXACIN 750 MG/1
TABLET ORAL
COMMUNITY
Start: 2022-01-01

## 2022-01-01 RX ORDER — SERTRALINE HYDROCHLORIDE 50 MG/1
50 TABLET, FILM COATED ORAL DAILY
Qty: 90 TABLET | Refills: 0 | Status: SHIPPED | OUTPATIENT
Start: 2022-01-01 | End: 2022-01-01

## 2022-01-01 RX ORDER — SODIUM CHLORIDE 9 MG/ML
250 INJECTION, SOLUTION INTRAVENOUS AS NEEDED
Status: DISCONTINUED | OUTPATIENT
Start: 2022-01-01 | End: 2022-01-01 | Stop reason: HOSPADM

## 2022-01-01 RX ORDER — SERTRALINE HYDROCHLORIDE 50 MG/1
50 TABLET, FILM COATED ORAL DAILY
Qty: 30 TABLET | Refills: 2 | Status: SHIPPED | OUTPATIENT
Start: 2022-01-01

## 2022-01-01 RX ORDER — FERROUS SULFATE 325(65) MG
325 TABLET, DELAYED RELEASE (ENTERIC COATED) ORAL 2 TIMES DAILY
Qty: 60 TABLET | Refills: 1 | Status: SHIPPED | OUTPATIENT
Start: 2022-01-01 | End: 2022-01-01

## 2022-01-01 RX ORDER — BARIUM SULFATE 20 MG/ML
450 SUSPENSION ORAL
Status: COMPLETED | OUTPATIENT
Start: 2022-01-01 | End: 2022-01-01

## 2022-01-01 RX ORDER — FUROSEMIDE 20 MG/1
20 TABLET ORAL 2 TIMES DAILY
Qty: 90 TABLET | Refills: 1 | Status: SHIPPED | OUTPATIENT
Start: 2022-01-01

## 2022-01-01 RX ADMIN — PIPERACILLIN AND TAZOBACTAM 3.38 G: 3; .375 INJECTION, POWDER, LYOPHILIZED, FOR SOLUTION INTRAVENOUS at 15:51

## 2022-01-01 RX ADMIN — PIPERACILLIN AND TAZOBACTAM 3.38 G: 3; .375 INJECTION, POWDER, LYOPHILIZED, FOR SOLUTION INTRAVENOUS at 19:26

## 2022-01-01 RX ADMIN — NOREPINEPHRINE BITARTRATE 4 MCG/MIN: 1 INJECTION, SOLUTION, CONCENTRATE INTRAVENOUS at 18:24

## 2022-01-01 RX ADMIN — SODIUM CHLORIDE 1000 ML: 9 INJECTION, SOLUTION INTRAVENOUS at 18:56

## 2022-01-01 RX ADMIN — SODIUM CHLORIDE 1000 ML: 9 INJECTION, SOLUTION INTRAVENOUS at 15:56

## 2022-01-01 RX ADMIN — VANCOMYCIN HYDROCHLORIDE 1500 MG: 1.5 INJECTION, POWDER, LYOPHILIZED, FOR SOLUTION INTRAVENOUS at 18:24

## 2022-01-01 RX ADMIN — FLUDEOXYGLUCOSE F18 7.83 MILLICURIE: 300 INJECTION INTRAVENOUS at 12:33

## 2022-01-01 RX ADMIN — HYDROMORPHONE HYDROCHLORIDE 1 MG: 1 INJECTION, SOLUTION INTRAMUSCULAR; INTRAVENOUS; SUBCUTANEOUS at 18:25

## 2022-01-01 RX ADMIN — BARIUM SULFATE 900 ML: 20 SUSPENSION ORAL at 12:56

## 2022-01-01 RX ADMIN — ONDANSETRON 4 MG: 2 INJECTION INTRAMUSCULAR; INTRAVENOUS at 15:51

## 2022-01-01 RX ADMIN — SODIUM CHLORIDE 1000 ML: 9 INJECTION, SOLUTION INTRAVENOUS at 02:02

## 2022-01-01 RX ADMIN — SODIUM CHLORIDE 1000 ML: 9 INJECTION, SOLUTION INTRAVENOUS at 14:03

## 2022-01-01 RX ADMIN — HYDROMORPHONE HYDROCHLORIDE 0.5 MG: 1 INJECTION, SOLUTION INTRAMUSCULAR; INTRAVENOUS; SUBCUTANEOUS at 17:13

## 2022-01-10 NOTE — TELEPHONE ENCOUNTER
----- Message from Bernie Medrano sent at 1/10/2022  3:35 PM EST -----  Subject: Results Request    QUESTIONS  Which lab or imaging result is the patient calling about? imaging of Chest   & pelvis  Which provider ordered the test? Brandy Trinidad   At what location was the test performed? Date the test was performed? 2022-01-07  Additional Information for Provider? Patient would like you to update him   with the results from the imaging done on 1/7 at [facility]. Please call advise.  ---------------------------------------------------------------------------  --------------  [address]  What is the best way for the office to contact you? OK to leave message on   voicemail  Preferred Call Back Phone Number?  [phone]

## 2022-01-14 NOTE — PROGRESS NOTES
Jam Tucker presents today for   Chief Complaint   Patient presents with    Follow-up     xray results        Is someone accompanying this pt? No     Is the patient using any DME equipment during OV? No     Depression Screening:  3 most recent PHQ Screens 1/14/2022   Little interest or pleasure in doing things Not at all   Feeling down, depressed, irritable, or hopeless Several days   Total Score PHQ 2 1   Trouble falling or staying asleep, or sleeping too much -   Feeling tired or having little energy -   Poor appetite, weight loss, or overeating -   Feeling bad about yourself - or that you are a failure or have let yourself or your family down -   Trouble concentrating on things such as school, work, reading, or watching TV -   Moving or speaking so slowly that other people could have noticed; or the opposite being so fidgety that others notice -   Thoughts of being better off dead, or hurting yourself in some way -   PHQ 9 Score -   How difficult have these problems made it for you to do your work, take care of your home and get along with others -       Learning Assessment:  Learning Assessment 1/26/2021   PRIMARY LEARNER Patient   HIGHEST LEVEL OF EDUCATION - PRIMARY LEARNER  GRADUATED HIGH SCHOOL OR GED   BARRIERS PRIMARY LEARNER NONE   CO-LEARNER CAREGIVER No   PRIMARY LANGUAGE ENGLISH   LEARNER PREFERENCE PRIMARY DEMONSTRATION   ANSWERED BY Nakia Guerra    RELATIONSHIP SELF       Fall Risk  Fall Risk Assessment, last 12 mths 1/26/2021   Able to walk? Yes   Fall in past 12 months? 0   Do you feel unsteady? 1   Are you worried about falling 1       Health Maintenance reviewed and discussed and ordered per Provider.     Health Maintenance Due   Topic Date Due    Hepatitis C Screening  Never done    COVID-19 Vaccine (1) Never done    Foot Exam Q1  Never done    MICROALBUMIN Q1  Never done    Eye Exam Retinal or Dilated  Never done    DTaP/Tdap/Td series (1 - Tdap) Never done    Shingrix Vaccine Age 50> (1 of 2) Never done    Low dose CT lung screening  Never done    Pneumococcal 65+ yrs at Risk Vaccine (1 of 2 - PCV13) Never done    Flu Vaccine (1) Never done   . Coordination of Care:  1. Have you been to the ER, urgent care clinic since your last visit? Hospitalized since your last visit? No     2. Have you seen or consulted any other health care providers outside of the [address] [place] [address] since your last visit? Include any pap smears or colon screening.  No

## 2022-01-20 NOTE — PROGRESS NOTES
Subjective: Sita Lomas is a 80 y.o. male who was seen for Follow-up (xray results )    Patient had some imaging done recently and the results were as followed. CT abdomen: Left para-aortic lymph node mass, slightly increased from prior study. May  reflect adenopathy versus metastatic liberty mass. Contrast enhanced imaging  and/or tissue sampling recommended.      Marked circumferential mural thickening/trabeculation of the urinary bladder  with multiple diverticula, more pronounced on current study. May represent  sequela of chronic outlet obstruction. Underlying infection or lungs in the  axillae. Correlate with urinalysis and cystoscopy.     Asymmetric mural thickening of the sigmoid colon at the rectosigmoid junction. Correlate colonoscopy.     Long segment of mural thickening of the distal ileum, new from prior study,  likely reflecting enteritis, infectious or inflammatory. Underdistention  possibly, however thought to be less likely given appearance. CT chest: Stable right lower lobe pulmonary nodules and tiny millimetric right upper lobe  nodules.     Upper tracheal secretions with nodularity and intraluminal web-like septation in  the proximal left upper lobe bronchus, may reflect aspiration.     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 He has not had any follow up in regards to these findings. He does have trouble urinating but denies any chest pain or shortness of breath. He also did not  his medications that was prescribed last visit. He does complain of some chronic diarrhea. Home Medications    Medication Sig Start Date End Date Taking? Authorizing Provider   sodium zirconium cyclosilicate (Lokelma) 5 gram powder packet Take 2 Packets by mouth daily. 12/28/21  Yes Andrea Guerrero MD   sodium bicarbonate 650 mg tablet Take 1 Tablet by mouth two (2) times a day.  12/28/21  Yes Armand Vora MD [facility]   ergocalciferol (ERGOCALCIFEROL) 1,250 mcg (50,000 unit) capsule Take 1 Capsule by mouth every seven (7) days. 12/28/21  Yes Mary John MD   sertraline (ZOLOFT) 50 mg tablet Take 1 Tablet by mouth daily. 12/27/21  Yes Mary John MD   glipiZIDE SR (GLUCOTROL XL) 5 mg CR tablet TAKE 1 TABLET BY MOUTH EVERY DAY 12/21/21  Yes Mary John MD   pravastatin (PRAVACHOL) 40 mg tablet Take 1 Tablet by mouth daily. 11/10/21  Yes Mary John MD   melatonin 10 mg capsule Take 10 mg by mouth nightly. Yes Provider, Historical   insulin lispro (HUMALOG) 100 unit/mL injection Patient only takes when he needs it 11/27/15  Yes Provider, Historical   nicotine (NICODERM CQ) 21 mg/24 hr 1 Patch by TransDERmal route every twenty-four (24) hours for 30 days. Patient not taking: Reported on 1/14/2022 12/27/21 1/26/22  Mary John MD   Symbicort 160-4.5 mcg/actuation HFAA INL 2 PFS PO TWICE DAILY. RINSE MOUTH AFTER U  Patient not taking: Reported on 1/14/2022 12/27/21   Mary John MD   Spiriva with HandiHaler 18 mcg inhalation capsule Take 1 Capsule by inhalation daily. Patient not taking: Reported on 1/14/2022 12/27/21   Mray John MD   dronedarone (Multaq) tab tablet Take 400 mg by mouth two (2) times daily (with meals).   Patient not taking: Reported on 1/14/2022    Provider, Historical   VENTOLIN HFA 90 mcg/actuation inhaler INL 1 TO 2 PFS PO Q 4 H FOR 6 H PRF SOB OR WHZ OR COUGH  Patient not taking: Reported on 1/14/2022 3/21/17   Provider, Historical      No Known Allergies  Social History     Tobacco Use    Smoking status: Current Every Day Smoker     Packs/day: 1.00     Years: 56.00     Pack years: 56.00    Smokeless tobacco: Never Used    Tobacco comment: he smokes a few per day, although he used to smoke one pack a day regularly   Vaping Use    Vaping Use: Never used   Substance Use Topics    Alcohol use: No    Drug use: Not Currently            Review of Systems   Genitourinary: Positive for difficulty urinating. All other systems reviewed and are negative.          Objective:     Visit Vitals  BP (!) 145/72 (BP 1 Location: Left upper arm, BP Patient Position: Sitting, BP Cuff Size: Adult)   Pulse 70   Temp 97.2 °F (36.2 °C) (Oral)   Ht 5' 8\" (1.727 m)   Wt 151 lb (68.5 kg)   SpO2 100%   BMI 22.96 kg/m²        General: alert, oriented, not in distress  Head: scalp normal, atraumatic  Eyes: pupils are equal and reactive, full and intact EOM's  Ears: patent ear canal, intact tympanic membrane  Nose: normal turbinates, no congestion or discharge  Lips/Mouth: moist lips and buccal mucosa, non-enlarged tonsils, pink throat  Neck: supple, no JVD, no lymphadenopathy, non-palpable thyroid  Chest/Lungs: clear breath sounds, no wheezing or crackles  Heart: normal rate, regular rhythm, no murmur  Abdomen: soft, non-distended, non-tender, normal bowel sounds, no organomegaly, no masses  Extremities: no focal deformities, no edema  Skin: no active skin lesions  Rectal: Prostate enlarged, no nodules palpated   Laboratory/Tests:  Office Visit on 01/14/2022   Component Date Value Ref Range Status    Color (UA POC) 01/14/2022 Colorless   Final    Clarity (UA POC) 01/14/2022 Clear   Final    Glucose (UA POC) 01/14/2022 Negative  Negative Final    Bilirubin (UA POC) 01/14/2022 Negative  Negative Final    Ketones (UA POC) 01/14/2022 Negative  Negative Final    Specific gravity (UA POC) 01/14/2022 1.025  1.001 - 1.035 Final    Blood (UA POC) 01/14/2022 2+  Negative Final    pH (UA POC) 01/14/2022 6.0  4.6 - 8.0 Final    Protein (UA POC) 01/14/2022 2+  Negative Final    Urobilinogen (UA POC) 01/14/2022 0.2 mg/dL  0.2 - 1 Final    Nitrites (UA POC) 01/14/2022 Negative  Negative Final    Leukocyte esterase (UA POC) 01/14/2022 3+  Negative Final   Hospital Outpatient Visit on 12/27/2021   Component Date Value Ref Range Status    WBC 12/27/2021 9.1  4.6 - 13.2 K/uL Final    RBC 12/27/2021 3.80* 4.35 - 5.65 M/uL Final    HGB 12/27/2021 9.5* 13.0 - 16.0 g/dL Final    HCT 12/27/2021 33.2* 36.0 - 48.0 % Final    MCV 12/27/2021 87.4  78.0 - 100.0 FL Final    MCH 12/27/2021 25.0  24.0 - 34.0 PG Final    MCHC 12/27/2021 28.6* 31.0 - 37.0 g/dL Final    RDW 12/27/2021 16.6* 11.6 - 14.5 % Final    PLATELET 17/64/0396 351  135 - 420 K/uL Final    MPV 12/27/2021 10.6  9.2 - 11.8 FL Final    NRBC 12/27/2021 0.0  0.0  WBC Final    ABSOLUTE NRBC 12/27/2021 0.00  0.00 - 0.01 K/uL Final    NEUTROPHILS 12/27/2021 73  40 - 73 % Final    LYMPHOCYTES 12/27/2021 17* 21 - 52 % Final    MONOCYTES 12/27/2021 9  3 - 10 % Final    EOSINOPHILS 12/27/2021 1  0 - 5 % Final    BASOPHILS 12/27/2021 0  0 - 2 % Final    IMMATURE GRANULOCYTES 12/27/2021 0  0 - 0.5 % Final    ABS. NEUTROPHILS 12/27/2021 6.7  1.8 - 8.0 K/UL Final    ABS. LYMPHOCYTES 12/27/2021 1.5  0.9 - 3.6 K/UL Final    ABS. MONOCYTES 12/27/2021 0.8  0.05 - 1.2 K/UL Final    ABS. EOSINOPHILS 12/27/2021 0.1  0.0 - 0.4 K/UL Final    ABS. BASOPHILS 12/27/2021 0.0  0.0 - 0.1 K/UL Final    ABS. IMM. GRANS. 12/27/2021 0.0  0.00 - 0.04 K/UL Final    DF 12/27/2021 AUTOMATED    Final    Manual    PLATELET COMMENTS 74/59/4993 Adequate    Final    RBC COMMENTS 12/27/2021     Final                    Value: Anisocytosis  1+      RBC COMMENTS 12/27/2021     Final                    Value:Microcytosis  1+      RBC COMMENTS 12/27/2021     Final                    Value:Poikilocytosis  1+      RBC COMMENTS 12/27/2021 SCHISTOCYTES, ELLIPTOCYTES & IVONNE CELLS NOTED   Final    Free Triiodothyronine (T3) 12/27/2021 1.6* 2.2 - 4.0 pg/mL Final    Vitamin B12 12/27/2021 500  211 - 911 pg/mL Final    T4, Free 12/27/2021 1.0  0.7 - 1.5 ng/dL Final    Hemoglobin A1c 12/27/2021 6.7* 4.2 - 5.6 % Final    Comment: (NOTE)  HbA1C Interpretive Ranges  <5.7              Normal  5.7 - 6.4         Consider Prediabetes  >6.5              Consider Diabetes      Est. average glucose 12/27/2021 146  mg/dL Final    Comment: (NOTE)  The eAG should be interpreted with patient characteristics in mind   since ethnicity, interindividual differences, red cell lifespan,   variation in rates of glycation, etc. may affect the validity of the   calculation.  LIPID PROFILE 12/27/2021      Final    Cholesterol, total 12/27/2021 121  <200 mg/dL Final    Triglyceride 12/27/2021 102  <150 mg/dL Final    Comment: The drugs N-acetylcysteine (NAC) and  Metamiszole have been found to cause falsely  low results in this chemical assay. Please  be sure to submit blood samples obtained  BEFORE administration of either of these  drugs to assure correct results.  HDL Cholesterol 12/27/2021 52  40 - 60 mg/dL Final    LDL, calculated 12/27/2021 48.6  0 - 100 mg/dL Final    VLDL, calculated 12/27/2021 20.4  mg/dL Final    CHOL/HDL Ratio 12/27/2021 2.3  0 - 5.0   Final    Calcium 12/27/2021 8.5  8.5 - 10.1 mg/dL Final    PTH, Intact 12/27/2021 237.0* 18.4 - 88.0 pg/mL Final    Vitamin D 25-Hydroxy 12/27/2021 12.1* 30 - 100 ng/mL Final    Comment: (NOTE)  Deficiency               <20 ng/mL  Insufficiency          20-30 ng/mL  Sufficient             ng/mL  Possible toxicity       >100 ng/mL    The Method used is Siemens Advia Centaur currently standardized to a   Center of Disease Control and Prevention (CDC) certified reference   [address]. Samples containing fluorescein dye can produce falsely   elevated values when tested with the ADVIA Centaur Vitamin D Assay. It is recommended that results in the toxic range, >100 ng/mL, be   retested 72 hours post fluorescein exposure.  Magnesium 12/27/2021 2.0  1.7 - 2.8 mg/dL Final    Phosphorus 12/27/2021 3.7  2.5 - 4.5 mg/dL Final    TSH 12/27/2021 1.50  0.35 - 6.20 uIU/mL Final    Comment:    Due to TSH heterogeneity, both structurally and degree of glycosylation, monoclonal antibodies used in the TSH assay may not accurately quantitate TSH.  Therefore, this result should be correlated with clinical findings as well as with other assessments of thyroid function, e.g., free T4, free T3.       Uric acid 12/27/2021 5.6  3.5 - 8.5 mg/dL Final    Sodium 12/27/2021 140  135 - 145 mmol/L Final    Potassium 12/27/2021 5.5* 3.2 - 5.1 mmol/L Final    Chloride 12/27/2021 109  94 - 111 mmol/L Final    CO2 12/27/2021 17* 21 - 33 mmol/L Final    Anion gap 12/27/2021 14  mmol/L Final    Glucose 12/27/2021 166* 70 - 110 mg/dL Final    BUN 12/27/2021 40* 9 - 21 mg/dL Final    Creatinine 12/27/2021 2.50* 0.8 - 1.50 mg/dL Final    BUN/Creatinine ratio 12/27/2021 16    Final    GFR est AA 12/27/2021 30  ml/min/1.73m2 Final    GFR est non-AA 12/27/2021 25  ml/min/1.73m2 Final    Comment: Estimated GFR is calculated using the IDMS-traceable Modification of Diet in Renal Disease (MDRD) Study equation, reported for both  Americans (GFRAA) and non- Americans (GFRNA), and normalized to 1.73m2 body surface area. The physician must decide which value applies to the patient. The MDRD study equation should only be used in individuals age 25 or older. It has not been validated for the following: pregnant women, patients with serious comorbid conditions, or on certain medications, or persons with extremes of body size, muscle mass, or nutritional status.  Calcium 12/27/2021 8.9  8.5 - 10.5 mg/dL Final    Bilirubin, total 12/27/2021 0.6  0.2 - 1.0 mg/dL Final    AST (SGOT) 12/27/2021 9* 17 - 74 U/L Final    ALT (SGPT) 12/27/2021 9  3 - 72 U/L Final    Alk. phosphatase 12/27/2021 131* 38 - 126 U/L Final    Protein, total 12/27/2021 7.5  6.1 - 8.4 g/dL Final    Albumin 12/27/2021 3.5  3.5 - 4.7 g/dL Final    Globulin 12/27/2021 4.0  g/dL Final    A-G Ratio 12/27/2021 0.9    Final   Office Visit on 12/27/2021   Component Date Value Ref Range Status    Hemoglobin A1c (POC) 12/27/2021 6.6  % Final         Assessment & Plan:     1.  Lymphadenopathy, periaortic  Will refer to surgery for possible lymph node biopsy. Will order PET scan. - PET/CT TUMOR IMAGE SKULL THIGH W (INI); Future  - REFERRAL TO SURGERY    2. CKD (chronic kidney disease) stage 4, GFR 15-29 ml/min ([facility])  Will follow with nephrology. Did not  his sodium bicarbonate tablets or lokelma. Has chronic hyperkalemia.   - CBC WITH AUTOMATED DIFF  - METABOLIC PANEL, COMPREHENSIVE  - MAGNESIUM  - PHOSPHORUS  - PSA, DIAGNOSTIC (PROSTATE SPECIFIC AG)    3. Other microscopic hematuria  U/A positive for blood. Referral to Urology for cystoscopy. - PET/CT TUMOR IMAGE SKULL THIGH W (INI); Future    4. Benign prostatic hyperplasia with lower urinary tract symptoms, symptom details unspecified  Referral to urology. Has bladder outlet obstruction  - AMB POC URINALYSIS DIP STICK AUTO W/ MICRO    5. Mural thickening of colon  No recent colonoscopy. Referral to GI.   - REFERRAL TO GASTROENTEROLOGY  - PET/CT TUMOR IMAGE SKULL THIGH W (INI); Future    6. Bladder outlet obstruction  - REFERRAL TO UROLOGY    7. Pulmonary nodules  - REFERRAL TO PULMONARY DISEASE     8. AAA with mural thrombus  - Needs to follow up with vascular. Currently asymptomatic. I have discussed the diagnosis with the patient and the intended plan as seen in the above orders. The patient has received an after-visit summary and questions were answered concerning future plans. I have discussed medication side effects and warnings with the patient as well. I have reviewed the plan of care with the patient, accepted their input and they are in agreement with the treatment goals. Previous lab and imaging results were reviewed by me.        Sami Zavala MD  January 20, 2022

## 2022-01-21 NOTE — TELEPHONE ENCOUNTER
----- Message from Michelle Shanelle sent at 1/20/2022  5:27 PM EST -----  Subject: Message to Provider    QUESTIONS  Information for Provider? pt returning missed call. Please call again.  ---------------------------------------------------------------------------  --------------  CALL BACK INFO  What is the best way for the office to contact you? OK to leave message on   voicemail  Preferred Call Back Phone Number? [phone]  ---------------------------------------------------------------------------  --------------  SCRIPT ANSWERS  Relationship to Patient?  Self

## 2022-01-26 NOTE — PROGRESS NOTES
Nephrology Consult    Patient: Indu Young MRN: [id number]  SSN: [id number]    YOB: 1940  Age: 80 y.o. Sex: male      Subjective:   Reason for the consultation. Elevated serum creatinine/chronic kidney disease stage IV. History of present illness. The patient is 72-year-old man with underlying history of diabetes mellitus type 2, hypertension, history of kidney stones, COPD, hyperlipidemia was referred for the evaluation of elevated serum creatinine of 2.4 based on labs on 1/18/2022. He seems to have underlying chronic kidney disease as his last creatinine was 2.5 on 12/27/2021. No history of chronic NSAIDs intake. He does have history of kidney stones in the past.  No abdominal pain nausea vomiting or diarrhea. He does complain of frequency and burning micturition. No lower extremity swelling. No chest complaints.     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 Nicole Remy at [address] [place] [address]      s/p right knee      Family History   Problem Relation Age of Onset    Diabetes Sister     No Known Problems Brother     No Known Problems Brother     Diabetes Sister     Cancer Mother         breast    Cancer Father      Social History     Tobacco Use    Smoking status: Current Every Day Smoker     Packs/day: 0.50     Years: 56.00     Pack years: 28.00    Smokeless tobacco: Never Used    Tobacco comment: he smokes a few per day, although he used to smoke one pack a day regularly   Substance Use Topics    Alcohol use: No      Current Outpatient Medications   Medication Sig Dispense Refill    sodium bicarbonate 650 mg tablet Take 1 Tablet by mouth two (2) times a day. 180 Tablet 0    sertraline (ZOLOFT) 50 mg tablet Take 1 Tablet by mouth daily. 30 Tablet 0    Symbicort 160-4.5 mcg/actuation HFAA INL 2 PFS PO TWICE DAILY. RINSE MOUTH AFTER U 10.2 g 3    Spiriva with HandiHaler 18 mcg inhalation capsule Take 1 Capsule by inhalation daily. 30 Capsule 5    glipiZIDE SR (GLUCOTROL XL) 5 mg CR tablet TAKE 1 TABLET BY MOUTH EVERY DAY 90 Tablet 1    pravastatin (PRAVACHOL) 40 mg tablet Take 1 Tablet by mouth daily. 90 Tablet 1    melatonin 10 mg capsule Take 10 mg by mouth nightly.  insulin lispro (HUMALOG) 100 unit/mL injection Patient only takes when he needs it      sodium zirconium cyclosilicate (Lokelma) 5 gram powder packet Take 2 Packets by mouth daily. (Patient not taking: Reported on 1/26/2022) 30 Packet 5    ergocalciferol (ERGOCALCIFEROL) 1,250 mcg (50,000 unit) capsule Take 1 Capsule by mouth every seven (7) days. (Patient not taking: Reported on 1/26/2022) 30 Capsule 0    nicotine (NICODERM CQ) 21 mg/24 hr 1 Patch by TransDERmal route every twenty-four (24) hours for 30 days. (Patient not taking: Reported on 1/14/2022) 30 Patch 0    dronedarone (Multaq) tab tablet Take 400 mg by mouth two (2) times daily (with meals). (Patient not taking: Reported on 1/14/2022)      VENTOLIN HFA 90 mcg/actuation inhaler INL 1 TO 2 PFS PO Q 4 H FOR 6 H PRF SOB OR WHZ OR COUGH (Patient not taking: Reported on 1/14/2022)  11        No Known Allergies    Review of Systems:  A comprehensive review of systems was negative except for that written in the History of Present Illness.     Objective:     Vitals:    01/26/22 1027   BP: 135/71   Pulse: 67   Weight: 65.8 kg (145 lb)        Physical Exam:  General: NAD  Eyes: sclera anicteric  Oral Cavity: No thrush or ulcers  Neck: no JVD  Chest: Fair bilateral air entry  Heart: normal sounds  Abdomen: soft and non tender   : no downs  Lower Extremities: no edema  Skin: no rash  Neuro: intact  Psychiatric: non-depressed          Assessment:     Hospital Problems  Date Reviewed: 7/22/2021    None          Plan:   1 elevated serum creatinine/chronic kidney disease stage IV. -He was referred for the evaluation of elevated serum creatinine of 2.4 based on labs on 1/18/2022.    -He seems to have underlying chronic kidney disease as his last creatinine was 2.5 in 12/27/2021.    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    -Clinically he looks euvolemic with no evidence of lower extremity edema. - I will order a urine analysis to look at the urine sediment and will quantify for proteinuria. -I will order a kidney ultrasound.    -He was advised to avoid any kind of NSAIDs.    -I will see him back in 3 weeks. 2.  Anemia.    -His hemoglobin is 9.1.   - It can be anemia of chronic disease. - I will order iron studies and ferritin level. 3.  Renal osteodystrophy.    -His calcium is okay. - I will check phosphorus intact PTH and vitamin D 25-hydroxy level. 4.  Diabetes mellitus type 2.    -He is on oral hypoglycemic agents    5 hypertension.    -His blood pressure is at goal.    -He is not noticed to be on any blood pressure medicines. Thank you for the consultation.       Signed By: Janki Yu MD     January 26, 2022

## 2022-01-26 NOTE — PROGRESS NOTES
Amaris Gao presents today for   Chief Complaint   Patient presents with    New Patient       Is someone accompanying this pt? no    Is the patient using any DME equipment during [address]? no    Depression Screening:  3 most recent PHQ Screens 1/26/2022   Little interest or pleasure in doing things Not at all   Feeling down, depressed, irritable, or hopeless Several days   Total Score PHQ 2 1   Trouble falling or staying asleep, or sleeping too much -   Feeling tired or having little energy -   Poor appetite, weight loss, or overeating -   Feeling bad about yourself - or that you are a failure or have let yourself or your family down -   Trouble concentrating on things such as school, work, reading, or watching TV -   Moving or speaking so slowly that other people could have noticed; or the opposite being so fidgety that others notice -   Thoughts of being better off dead, or hurting yourself in some way -   PHQ 9 Score -   How difficult have these problems made it for you to do your work, take care of your home and get along with others -       Learning Assessment:  Learning Assessment 1/26/2021   PRIMARY LEARNER Patient   HIGHEST LEVEL OF EDUCATION - PRIMARY LEARNER  GRADUATED HIGH SCHOOL OR GED   BARRIERS PRIMARY LEARNER NONE   CO-LEARNER CAREGIVER No   PRIMARY LANGUAGE ENGLISH   LEARNER PREFERENCE PRIMARY DEMONSTRATION   ANSWERED BY Lilly Richardson    RELATIONSHIP SELF       Fall Risk  Fall Risk Assessment, last 12 mths 1/26/2022   Able to walk? Yes   Fall in past 12 months? 0   Do you feel unsteady? 0   Are you worried about falling 0       Coordination of Care:  1. Have you been to the ER, urgent care clinic since your last visit? Hospitalized since your last visit? no    2. Have you seen or consulted any other health care providers outside of the [address] [place] [address] since your last visit? Include any pap smears or colon screening.  Yes, PCP [place]

## 2022-01-26 NOTE — PROGRESS NOTES
Consult    Patient: Michael Bauer MRN: [id number]  SSN: [id number]    YOB: 1940  Age: 80 y.o. Sex: male      Subjective: Michael Bauer is a 80 y.o. male who is being seen for  Weight loss and large para aortic mass. Recent CT abdomen showed enlargement of a previously noted para aortic mass, now to 7 cm. CT also showed thickening of the sigmoid colon. Mr. Emeka Guillen tells me he had a colonoscopy one year ago. He has a history of colon polyps. He also complains of chronic abdominal pain. He denies nausea, vomiting , constipation or diarrhea. He denies passage of BRBPR. He denies black stools. He has been referred to me to get a tissue diagnosis of the enlarging para aortic mass. No Known Allergies  Current Outpatient Medications   Medication Sig Dispense Refill    sodium bicarbonate 650 mg tablet Take 1 Tablet by mouth two (2) times a day. 180 Tablet 0    sertraline (ZOLOFT) 50 mg tablet Take 1 Tablet by mouth daily. 30 Tablet 0    Symbicort 160-4.5 mcg/actuation HFAA INL 2 PFS PO TWICE DAILY. RINSE MOUTH AFTER U 10.2 g 3    Spiriva with HandiHaler 18 mcg inhalation capsule Take 1 Capsule by inhalation daily. 30 Capsule 5    glipiZIDE SR (GLUCOTROL XL) 5 mg CR tablet TAKE 1 TABLET BY MOUTH EVERY DAY 90 Tablet 1    pravastatin (PRAVACHOL) 40 mg tablet Take 1 Tablet by mouth daily. 90 Tablet 1    melatonin 10 mg capsule Take 10 mg by mouth nightly.  insulin lispro (HUMALOG) 100 unit/mL injection Patient only takes when he needs it      sodium zirconium cyclosilicate (Lokelma) 5 gram powder packet Take 2 Packets by mouth daily. (Patient not taking: Reported on 1/26/2022) 30 Packet 5    ergocalciferol (ERGOCALCIFEROL) 1,250 mcg (50,000 unit) capsule Take 1 Capsule by mouth every seven (7) days. (Patient not taking: Reported on 1/26/2022) 30 Capsule 0    nicotine (NICODERM CQ) 21 mg/24 hr 1 Patch by TransDERmal route every twenty-four (24) hours for 30 days. (Patient not taking: Reported on 1/14/2022) 30 Patch 0    dronedarone (Multaq) tab tablet Take 400 mg by mouth two (2) times daily (with meals). 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 Lyon Friendly at [address] [place] [address]      s/p right knee      Social History     Tobacco Use    Smoking status: Current Every Day Smoker     Packs/day: 0.50     Years: 56.00     Pack years: 28.00    Smokeless tobacco: Never Used    Tobacco comment: he smokes a few per day, although he used to smoke one pack a day regularly   Substance Use Topics    Alcohol use: No     Family History   Problem Relation Age of Onset    Diabetes Sister     No Known Problems Brother     No Known Problems Brother     Diabetes Sister     Cancer Mother         breast    Cancer Father            Review of Systems:    General: Denies fevers, chills, night sweats, fatigue. Continues with unexplained weight loss. HEENT: Denies changes in auditory or visual acuity, recurrent pharyngitis, epistaxis, chronic rhinorrhea, vertigo    Respiratory: Denies increasing shortness of breath, productive cough, hemoptysis. Positive history of COPD. Cardiac:  History of CAD.  Denies chest pain.     GI: Denies dysphagia, recurrent emesis, hematemesis, changes in bowel habits, hematochezia, melena. :Complains of increasing difficulty starting urinary stream.  Complains of increased frequency with frequent nocturia. Musculoskeletal: Denies fractures, dislocations    Neurologic: Denies history of CVA, paralysis paresthesias, recurrent cephalgia, seizures    Endocrine: Denies polyuria, polydipsia, polyphagia, heat and cold intolerance    Lymph/heme: Denies a history of malignancy, anemia, bruising, blood transfusions    Integumentary: Negative for dermatitis     Objective:     Vitals:    01/26/22 1005   BP: 135/71   Pulse: 67   SpO2: 100%   Weight: 65.8 kg (145 lb)   Height: 5' 8\" (1.727 m)        General: no acute distress, appears chronically ill. Head: Normocephalic, atraumatic  Mouth: Clear, no overt lesions, oral mucosa is pink and moist.  Neck: Supple, no masses, no adenopathy or carotid bruits, trachea midline  Resp: Clear to auscultation bilaterally, no wheezing, rhonchi, or rales, excursions normal and symmetrical.  Cardio: Regular rate and rhythm, no murmurs, clicks, gallops, or rubs. Abdomen: soft, nondistended, normoactive bowel sounds, no hernias. Mild diffuse tenderness right and left. No guarding or rebound. Extremities: Warm, well perfused, no tenderness or swelling, normal gait/station, without edema or varicosities  Neuro: Sensation and strength grossly intact and symmetrical.  Psych: Alert and oriented to person, place, and time. 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 R/O metastatic disease. Thickening of sigmoid colon on CT. 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 ? Colon primary  Worsening symptoms of BPH. Plan: Will arrange for CT guided core biopsy of the para aortic mass. No indication at this time for more invasive approach.   Should be referred for colonoscopy based on CT findings. Needs a urology referral for worsening symptoms of BPH. F/U with me as needed.     Signed By: Lois Mei MD     January 26, 2022

## 2022-01-26 NOTE — PROGRESS NOTES
Javier Hewitt presents today for   Chief Complaint   Patient presents with    New Patient     referral by Dr. Nahid Connell       Is someone accompanying this pt? no    Is the patient using any DME equipment during [address]? no    Depression Screening:  3 most recent PHQ Screens 1/26/2022   Little interest or pleasure in doing things Not at all   Feeling down, depressed, irritable, or hopeless Several days   Total Score PHQ 2 1   Trouble falling or staying asleep, or sleeping too much -   Feeling tired or having little energy -   Poor appetite, weight loss, or overeating -   Feeling bad about yourself - or that you are a failure or have let yourself or your family down -   Trouble concentrating on things such as school, work, reading, or watching TV -   Moving or speaking so slowly that other people could have noticed; or the opposite being so fidgety that others notice -   Thoughts of being better off dead, or hurting yourself in some way -   PHQ 9 Score -   How difficult have these problems made it for you to do your work, take care of your home and get along with others -       Learning Assessment:  Learning Assessment 1/26/2021   PRIMARY LEARNER Patient   HIGHEST LEVEL OF EDUCATION - PRIMARY LEARNER  GRADUATED HIGH SCHOOL OR GED   BARRIERS PRIMARY LEARNER NONE   CO-LEARNER CAREGIVER No   PRIMARY LANGUAGE ENGLISH   LEARNER PREFERENCE PRIMARY DEMONSTRATION   ANSWERED BY Geri VILLANUEVA SELF       Abuse Screening:  No flowsheet data found. Fall Risk  Fall Risk Assessment, last 12 mths 1/26/2022   Able to walk? Yes   Fall in past 12 months? 0   Do you feel unsteady? 0   Are you worried about falling 0       ADL  No flowsheet data found. Health Maintenance reviewed and discussed and ordered per Provider.     Health Maintenance Due   Topic Date Due    Hepatitis C Screening  Never done    COVID-19 Vaccine (1) Never done    Foot Exam Q1  Never done    MICROALBUMIN Q1  Never done    Eye Exam Retinal or Dilated  Never done    DTaP/Tdap/Td series (1 - Tdap) Never done    Shingrix Vaccine Age 50> (1 of 2) Never done    Low dose CT lung screening  Never done    Pneumococcal 65+ yrs at Risk Vaccine (1 of 2 - PCV13) Never done    Flu Vaccine (1) Never done   . Coordination of Care:  1. Have you been to the ER, urgent care clinic since your last visit? Hospitalized since your last visit? no    2. Have you seen or consulted any other health care providers outside of the [address] [place] [address] since your last visit? Include any pap smears or colon screening.  no

## 2022-02-15 NOTE — PROGRESS NOTES
Genie Brizuela presents today for   Chief Complaint   Patient presents with    Follow-up     depression        Is someone accompanying this pt? No     Is the patient using any DME equipment during OV? No     Depression Screening:  3 most recent PHQ Screens 2/15/2022   Little interest or pleasure in doing things Not at all   Feeling down, depressed, irritable, or hopeless More than half the days   Total Score PHQ 2 2   Trouble falling or staying asleep, or sleeping too much -   Feeling tired or having little energy -   Poor appetite, weight loss, or overeating -   Feeling bad about yourself - or that you are a failure or have let yourself or your family down -   Trouble concentrating on things such as school, work, reading, or watching TV -   Moving or speaking so slowly that other people could have noticed; or the opposite being so fidgety that others notice -   Thoughts of being better off dead, or hurting yourself in some way -   PHQ 9 Score -   How difficult have these problems made it for you to do your work, take care of your home and get along with others -       Learning Assessment:  Learning Assessment 1/26/2021   PRIMARY LEARNER Patient   HIGHEST LEVEL OF EDUCATION - PRIMARY LEARNER  GRADUATED HIGH SCHOOL OR GED   BARRIERS PRIMARY LEARNER NONE   CO-LEARNER CAREGIVER No   PRIMARY LANGUAGE ENGLISH   LEARNER PREFERENCE PRIMARY DEMONSTRATION   ANSWERED BY John Kent    RELATIONSHIP SELF       Fall Risk  Fall Risk Assessment, last 12 mths 1/26/2022   Able to walk? Yes   Fall in past 12 months? 0   Do you feel unsteady? 0   Are you worried about falling 0       Health Maintenance reviewed and discussed and ordered per Provider.     Health Maintenance Due   Topic Date Due    Hepatitis C Screening  Never done    COVID-19 Vaccine (1) Never done    Foot Exam Q1  Never done    MICROALBUMIN Q1  Never done    Eye Exam Retinal or Dilated  Never done    DTaP/Tdap/Td series (1 - Tdap) Never done    Shingrix Vaccine Age 50> (1 of 2) Never done    Low dose CT lung screening  Never done    Pneumococcal 65+ yrs at Risk Vaccine (1 of 2 - PCV13) Never done    Flu Vaccine (1) Never done   . Coordination of Care:    1. \"Have you been to the ER, urgent care clinic since your last visit? Hospitalized since your last visit? \" No    2. \"Have you seen or consulted any other health care providers outside of the [address] [place] [address] since your last visit? \" No     3. For patients aged 39-70: Has the patient had a colonoscopy? Yes - no Care Gap present     If the patient is female:    4. For patients aged 41-77: Has the patient had a mammogram within the past 2 years? NA - based on age/sex    5. For patients aged 21-65: Has the patient had a pap smear?  NA - based on age/sex

## 2022-02-16 NOTE — PROGRESS NOTES
Javier Hewitt presents today for   Chief Complaint   Patient presents with    Follow-up       Is someone accompanying this pt? no    Is the patient using any DME equipment during OV? no    Depression Screening:  3 most recent PHQ Screens 2/16/2022   Little interest or pleasure in doing things Not at all   Feeling down, depressed, irritable, or hopeless Not at all   Total Score PHQ 2 0   Trouble falling or staying asleep, or sleeping too much -   Feeling tired or having little energy -   Poor appetite, weight loss, or overeating -   Feeling bad about yourself - or that you are a failure or have let yourself or your family down -   Trouble concentrating on things such as school, work, reading, or watching TV -   Moving or speaking so slowly that other people could have noticed; or the opposite being so fidgety that others notice -   Thoughts of being better off dead, or hurting yourself in some way -   PHQ 9 Score -   How difficult have these problems made it for you to do your work, take care of your home and get along with others -       Learning Assessment:  Learning Assessment 1/26/2021   PRIMARY LEARNER Patient   HIGHEST LEVEL OF EDUCATION - PRIMARY LEARNER  GRADUATED HIGH SCHOOL OR GED   BARRIERS PRIMARY LEARNER NONE   CO-LEARNER CAREGIVER No   PRIMARY LANGUAGE ENGLISH   LEARNER PREFERENCE PRIMARY DEMONSTRATION   ANSWERED BY Geri VILLANUEVA SELF       Abuse Screening:  No flowsheet data found. Fall Risk  Fall Risk Assessment, last 12 mths 2/16/2022   Able to walk? Yes   Fall in past 12 months? 0   Do you feel unsteady? 0   Are you worried about falling 0       ADL  No flowsheet data found. Health Maintenance reviewed and discussed and ordered per Provider.     Health Maintenance Due   Topic Date Due    Hepatitis C Screening  Never done    COVID-19 Vaccine (1) Never done    Foot Exam Q1  Never done    MICROALBUMIN Q1  Never done    Eye Exam Retinal or Dilated  Never done   [employer] DTaP/Tdap/Td series (1 - Tdap) Never done    Shingrix Vaccine Age 50> (1 of 2) Never done    Low dose CT lung screening  Never done    Pneumococcal 65+ yrs at Risk Vaccine (1 of 2 - PCV13) Never done    Flu Vaccine (1) Never done   . Coordination of Care:  1. Have you been to the ER, urgent care clinic since your last visit? Hospitalized since your last visit? no    2. Have you seen or consulted any other health care providers outside of the [address] [place] [address] since your last visit? Include any pap smears or colon screening.  no

## 2022-02-16 NOTE — PROGRESS NOTES
Nephrology Consult    Patient: Candido Armenta MRN: [id number]  SSN: [id number]    YOB: 1940  Age: 80 y.o. Sex: male      Subjective:   Reason for the consultation. Elevated serum creatinine/chronic kidney disease stage IV. History of present illness. The patient is 65-year-old man with underlying history of diabetes mellitus type 2, hypertension, history of kidney stones, COPD, hyperlipidemia was referred for the evaluation of elevated serum creatinine of 2.4 based on labs on 1/18/2022 came for follow-up visit. He is complaining of left flank pain radiating down to his groin area and having voiding issues with painful micturition. No gross hematuria. No fever or chills. No lower extremity swelling. No chest complaints.     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 Meliton Guzman at [address] [place] [address]      s/p right knee      Family History   Problem Relation Age of Onset    Diabetes Sister     No Known Problems Brother     No Known Problems Brother     Diabetes Sister     Cancer Mother         breast    Cancer Father      Social History     Tobacco Use    Smoking status: Current Every Day Smoker     Packs/day: 0.50     Years: 56.00     Pack years: 28.00    Smokeless tobacco: Never Used    Tobacco comment: he smokes a few per day, although he used to smoke one pack a day regularly   Substance Use Topics  Alcohol use: No      Current Outpatient Medications   Medication Sig Dispense Refill    ferrous sulfate (IRON) 325 mg (65 mg iron) EC tablet Take 1 Tablet by mouth three (3) times daily (with meals). 270 Tablet 1    tamsulosin (Flomax) 0.4 mg capsule Take 1 Capsule by mouth daily. 90 Capsule 1    sodium bicarbonate 650 mg tablet Take 1 Tablet by mouth two (2) times a day. 180 Tablet 0    sertraline (ZOLOFT) 50 mg tablet Take 1 Tablet by mouth daily. 30 Tablet 0    Symbicort 160-4.5 mcg/actuation HFAA INL 2 PFS PO TWICE DAILY. RINSE MOUTH AFTER U 10.2 g 3    Spiriva with HandiHaler 18 mcg inhalation capsule Take 1 Capsule by inhalation daily. 30 Capsule 5    glipiZIDE SR (GLUCOTROL XL) 5 mg CR tablet TAKE 1 TABLET BY MOUTH EVERY DAY 90 Tablet 1    pravastatin (PRAVACHOL) 40 mg tablet Take 1 Tablet by mouth daily. 90 Tablet 1    melatonin 10 mg capsule Take 10 mg by mouth nightly.  insulin lispro (HUMALOG) 100 unit/mL injection Patient only takes when he needs it      sodium zirconium cyclosilicate (Lokelma) 5 gram powder packet Take 2 Packets by mouth daily. (Patient not taking: Reported on 1/26/2022) 30 Packet 5    ergocalciferol (ERGOCALCIFEROL) 1,250 mcg (50,000 unit) capsule Take 1 Capsule by mouth every seven (7) days. (Patient not taking: Reported on 1/26/2022) 30 Capsule 0    dronedarone (Multaq) tab tablet Take 400 mg by mouth two (2) times daily (with meals). (Patient not taking: Reported on 1/14/2022)      VENTOLIN HFA 90 mcg/actuation inhaler INL 1 TO 2 PFS PO Q 4 H FOR 6 H PRF SOB OR WHZ OR COUGH (Patient not taking: Reported on 1/14/2022)  11        No Known Allergies    Review of Systems:  A comprehensive review of systems was negative except for that written in the History of Present Illness.     Objective:     Vitals:    02/16/22 1027   BP: 127/61   Pulse: 66   SpO2: 100%   Weight: 65.3 kg (144 lb)   Height: 5' 8\" (1.727 m)        Physical Exam:  General: NAD  Eyes: sclera anicteric  Oral Cavity: No thrush or ulcers  Neck: no JVD  Chest: Fair bilateral air entry  Heart: normal sounds  Abdomen: soft and non tender   : no downs  Lower Extremities: no edema  Skin: no rash  Neuro: intact  Psychiatric: non-depressed          Assessment:     Hospital Problems  Date Reviewed: 7/22/2021    None          Plan:   1 elevated serum creatinine/chronic kidney disease stage IV. -He was referred for the evaluation of elevated serum creatinine of 2.4 based on labs on 1/18/2022.    -Repeat labs showed creatinine is 2.5.  -He seems to have underlying chronic kidney disease as his last creatinine was 2.5 in 12/27/2021.    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    -Clinically he looks euvolemic with no evidence of lower extremity edema.   -[place] urine analysis and no proteinuria.    -A  kidney ultrasound showed severe left-sided hydronephrosis but no evidence of kidney stone.    -He was already put on Flomax.  -He was advised to avoid any kind of NSAIDs.    -I will see him back in 3 weeks. 2.  Hydronephrosis:  -Unilateral left-sided  -Renal ultrasound on 2/10 showed severe left-sided hydronephrosis.  -No evidence of renal calculi. -He was put on Flomax.  -His creatinine is stable at 2.4-2.5.  -will repeat Renal US in 1 week  -He has already scheduled follow-up appointment with urologist on 2/24.  -He was advised to go to the ER if his flank pain continues to get worse. 3.  Abdominal mass:  -Renal ultrasound showed 7.8 cm para-aortic mass/lymphadenopathy. -CT abdomen/PET scan showed para-aortic mass/lymphadenopathy or possible metastatic disease.  -The patient has already been scheduled to see urologist on 2/24. -CT chest stable pulmonary nodules.  -Patient is aware of abdominal mass. 4.  Anemia.    -His hemoglobin is 9.2.   -tsat 6, Ferritin not done  -will add FeSO4 325 mg bid.      3.  Renal osteodystrophy.    -His calcium and phosphorus are ok  -intact .8 and vitamin D 25-hydroxy level 9.0.  -will add Vit 50,000 units weekly for 16 weeks and zemplar 1 mcg daily. 4.  Diabetes mellitus type 2.    -He is on oral hypoglycemic agents    5 hypertension.    -His blood pressure is at goal.    -He is not noticed to be on any blood pressure medicines.     6 Hyperkalemia:  -K 5.4  -will put on Lokelma 10 g daily (samples provided from the office)        Signed By: Husam Arthur MD     February 16, 2022

## 2022-02-21 NOTE — PROGRESS NOTES
Subjective: Beth Moody is a 80 y.o. male who was seen for Follow-up (depression )    Patient had a PET scan which was concerned for metastatic disease. He has been having a lot of chronic diarrhea mild lower abdominal pain. He also has decreased urination. He has follow up with nephrology, urology and GI. Also had oncology referral made last visit. He is supposed to have a CT guided biopsy of his periaortic lymph node but is not sure when it is. Home Medications    Medication Sig Start Date End Date Taking? Authorizing Provider   ferrous sulfate (IRON) 325 mg (65 mg iron) EC tablet Take 1 Tablet by mouth three (3) times daily (with meals). 2/15/22  Yes Brigida Ovalles MD   tamsulosin (Flomax) 0.4 mg capsule Take 1 Capsule by mouth daily. 2/15/22  Yes Brigida Ovalles MD   sodium bicarbonate 650 mg tablet Take 1 Tablet by mouth two (2) times a day. 12/28/21  Yes Brigida Ovalles MD   sertraline (ZOLOFT) 50 mg tablet Take 1 Tablet by mouth daily. 12/27/21  Yes Brigida Ovalles MD   Symbicort 160-4.5 mcg/actuation HFAA INL 2 PFS PO TWICE DAILY. RINSE MOUTH AFTER U 12/27/21  Yes Brigida Ovalles MD   Spiriva with HandiHaler 18 mcg inhalation capsule Take 1 Capsule by inhalation daily. 12/27/21  Yes Brigida Ovalles MD   glipiZIDE SR (GLUCOTROL XL) 5 mg CR tablet TAKE 1 TABLET BY MOUTH EVERY DAY 12/21/21  Yes Brigida Ovalles MD   pravastatin (PRAVACHOL) 40 mg tablet Take 1 Tablet by mouth daily. 11/10/21  Yes Brigida Ovalles MD   melatonin 10 mg capsule Take 10 mg by mouth nightly. Yes Provider, Historical   insulin lispro (HUMALOG) 100 unit/mL injection Patient only takes when he needs it 11/27/15  Yes Provider, Historical   ferrous sulfate (IRON) 325 mg (65 mg iron) EC tablet Take 1 Tablet by mouth two (2) times a day for 30 days. 2/16/22 3/18/22  Young Meredith MD   paricalcitoL (Zemplar) 1 mcg capsule Take 1 Capsule by mouth daily for 30 days.  2/16/22 3/18/22  Young Meredith MD   ergocalciferol (ERGOCALCIFEROL) 1,250 mcg (50,000 unit) capsule Take 1 Capsule by mouth every seven (7) days for 16 doses. 2/16/22 6/2/22  Giovanny Saab MD   sodium zirconium cyclosilicate (Lokelma) 5 gram powder packet Take 2 Packets by mouth daily. Patient not taking: Reported on 1/26/2022 12/28/21   Fili Unger MD   ergocalciferol (ERGOCALCIFEROL) 1,250 mcg (50,000 unit) capsule Take 1 Capsule by mouth every seven (7) days. Patient not taking: Reported on 1/26/2022 12/28/21   Fili Unger MD   dronedarone (Multaq) tab tablet Take 400 mg by mouth two (2) times daily (with meals). Patient not taking: Reported on 1/14/2022    Provider, Historical   VENTOLIN HFA 90 mcg/actuation inhaler INL 1 TO 2 PFS PO Q 4 H FOR 6 H PRF SOB OR WHZ OR COUGH  Patient not taking: Reported on 1/14/2022 3/21/17   Provider, Historical      No Known Allergies  Social History     Tobacco Use    Smoking status: Current Every Day Smoker     Packs/day: 0.50     Years: 56.00     Pack years: 28.00    Smokeless tobacco: Never Used    Tobacco comment: he smokes a few per day, although he used to smoke one pack a day regularly   Vaping Use    Vaping Use: Never used   Substance Use Topics    Alcohol use: No    Drug use: Not Currently        Review of Systems   All other systems reviewed and are negative.       Objective:     Visit Vitals  /65 (BP 1 Location: Right upper arm, BP Patient Position: Sitting, BP Cuff Size: Adult)   Pulse 71   Temp 98.1 °F (36.7 °C) (Oral)   Ht 5' 8\" (1.727 m)   Wt 147 lb (66.7 kg)   SpO2 100%   BMI 22.35 kg/m²        General: alert, oriented, not in distress  Head: scalp normal, atraumatic  Eyes: pupils are equal and reactive, full and intact EOM's  Ears: patent ear canal, intact tympanic membrane  Nose: normal turbinates, no congestion or discharge  Lips/Mouth: moist lips and buccal mucosa, non-enlarged tonsils, pink throat  Neck: supple, no JVD, no lymphadenopathy, non-palpable thyroid  Chest/Lungs: clear breath sounds, no wheezing or crackles  Heart: normal rate, regular rhythm, no murmur  Abdomen: soft, non-distended, non-tender, normal bowel sounds, no organomegaly, no masses  Extremities: no focal deformities, no edema  Skin: no active skin lesions      Assessment & Plan:     1. Lymphadenopathy, periaortic  Will need to call noreen to see when his CT guided biopsy is. Patient is aware    2. Mural thickening of colon  Follow up with GI for colonoscopy. Possibly primary disease for metastatsis    3. Iron deficiency anemia, unspecified iron deficiency anemia type  Likely has metastatic colon cancer. Follow up with colonoscopy. Will start ferrous sulfate    4. Bladder outlet obstruction  Follow up with urology    5. Chronic obstructive pulmonary disease, unspecified COPD type ([facility] [address]  Continue symbicort, spiriva    6. CKD (chronic kidney disease) stage 4, GFR 15-29 ml/min ([facility])  Follows with nephro    7. Moderate episode of recurrent major depressive disorder ([facility])  Continue zoloft    8. Type 2 diabetes mellitus with stage 4 chronic kidney disease, without long-term current use of insulin ([facility])  Continue glipizide    9. Abdominal aortic aneurysm (AAA) without rupture ([facility] [address]  No change in size recently             I have discussed the diagnosis with the patient and the intended plan as seen in the above orders. The patient has received an after-visit summary and questions were answered concerning future plans. I have discussed medication side effects and warnings with the patient as well. I have reviewed the plan of care with the patient, accepted their input and they are in agreement with the treatment goals. Previous lab and imaging results were reviewed by me.        Chance Galeas MD  February 21, 2022

## 2022-05-21 NOTE — PROGRESS NOTES
Report given to oncoming JERO Aquino  Pt's niece remains at bedside, BP improving after levo and blood started.

## 2022-05-21 NOTE — ED TRIAGE NOTES
Pt states that he cannot hold anything down. Reports drinking a lot of fluid, not urinating, on lasix. Paramedics called Thursday for \"heat stroke\", niece states his temp on Thursday was 102.5--- no fever since. States he was dehydrated. She also reports that he has been shaking and \"blacking out\" prior to Thursday but has not done it since. Pt is vaccinated for Covid x2 and booster x1    Kidney stent placed 5/11. C/o pain across lower abdomen, rating pain 7/10.

## 2022-05-21 NOTE — ED PROVIDER NOTES
EMERGENCY DEPARTMENT HISTORY AND PHYSICAL EXAM      Date: 5/21/2022  Patient Name: Kadi Nash      History of Presenting Illness     Chief Complaint   Patient presents with    Dehydration       History Provided By: Patient And patient's niece    HPI: Kadi Nash, [de-identified] y.o. male with a past medical history significant COPD,Diabetes, CKD, hyperlipidemia, hypertensive cardiovascular disease presents to the ED with cc of weakness and possible dehydration. Pt brought to ED by EMS, called at scene by patient's niece thought patient was not dehydrated. Patient had stent put in 10 days ago by Dr. Sierra Field at [facility] for blockage of the left ureter. She states over the last few days patient has been very weak and dizzy, ports fever 2 days ago 102.5, today patient could hardly stand up without getting dizzy. She also describes some nausea and vomiting. Poor historian but advises lower abdomen pain, and nausea chest pain or shortness of breath. Movement was 2 days ago. Scribes lower abdomen pain is now constant localized pain. There are no other complaints, changes, or physical findings at this time. PCP: Jinny Resendiz MD    Current Facility-Administered Medications   Medication Dose Route Frequency Provider Last Rate Last Admin    0.9% sodium chloride infusion 250 mL  250 mL IntraVENous PRN Jaron Carrillo MD        NOREPINephrine (LEVOPHED) 4,000 mcg in dextrose 5% 250 mL infusion  4 mcg/min IntraVENous TITRATE Jaron Carrillo MD 15 mL/hr at 05/21/22 1824 4 mcg/min at 05/21/22 1824     Current Outpatient Medications   Medication Sig Dispense Refill    furosemide (LASIX) 20 mg tablet Take 20 mg by mouth two (2) times a day.       sertraline (ZOLOFT) 50 mg tablet TAKE 1 TABLET BY MOUTH DAILY 30 Tablet 0    paricalcitoL (ZEMPLAR) 1 mcg capsule TAKE 1 CAPSULE BY MOUTH DAILY 90 Capsule 1    pravastatin (PRAVACHOL) 40 mg tablet TAKE 1 TABLET BY MOUTH DAILY 90 Tablet 1    tamsulosin (Flomax) 0.4 mg capsule Take 1 Capsule by mouth daily. 90 Capsule 1    Symbicort 160-4.5 mcg/actuation HFAA INL 2 PFS PO TWICE DAILY. RINSE MOUTH AFTER U 10.2 g 3    Spiriva with HandiHaler 18 mcg inhalation capsule Take 1 Capsule by inhalation daily. 30 Capsule 5    glipiZIDE SR (GLUCOTROL XL) 5 mg CR tablet TAKE 1 TABLET BY MOUTH EVERY DAY 90 Tablet 1    melatonin 10 mg capsule Take 10 mg by mouth nightly.  dronedarone (Multaq) tab tablet Take 400 mg by mouth two (2) times daily (with meals).  amoxicillin-clavulanate (AUGMENTIN) 875-125 mg per tablet Take 1 Tablet by mouth two (2) times a day. 14 Tablet 0    doxycycline (ADOXA) 100 mg tablet Take 1 Tablet by mouth two (2) times a day. 20 Tablet 0    ergocalciferol (ERGOCALCIFEROL) 1,250 mcg (50,000 unit) capsule Take 1 Capsule by mouth every seven (7) days for 16 doses. 16 Capsule 0    ferrous sulfate (IRON) 325 mg (65 mg iron) EC tablet Take 1 Tablet by mouth three (3) times daily (with meals). (Patient not taking: Reported on 5/21/2022) 270 Tablet 1    sodium bicarbonate 650 mg tablet Take 1 Tablet by mouth two (2) times a day. 180 Tablet 0    ergocalciferol (ERGOCALCIFEROL) 1,250 mcg (50,000 unit) capsule Take 1 Capsule by mouth every seven (7) days.  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 Balbir Garcia at [facility]      s/p right knee    HX UROLOGICAL  05/11/2022    CYSTOSCOPY, CYSTOGRAM, BILATERAL RETROGRADE PYELOGRAM, LEFT STENT PLACEMENT ; Dr. Bautista Morning       Family History:  Family History   Problem Relation Age of Onset    Diabetes Sister     No Known Problems Brother     No Known Problems Brother     Diabetes Sister     Cancer Mother         breast    Cancer Father        Social History:  Social History     Tobacco Use    Smoking status: Current Every Day Smoker     Packs/day: 0.50     Years: 56.00     Pack years: 28.00    Smokeless tobacco: Never Used    Tobacco comment: he smokes a few per day, although he used to smoke one pack a day regularly   Vaping Use    Vaping Use: Never used   Substance Use Topics    Alcohol use: No    Drug use: Not Currently       Allergies:  No Known Allergies      Review of Systems     Review of Systems   Constitutional: Positive for appetite change, fatigue and fever. Negative for chills. HENT: Negative for congestion and sore throat. Respiratory: Negative for cough and shortness of breath. Cardiovascular: Negative for chest pain and palpitations. Gastrointestinal: Positive for abdominal pain, nausea and vomiting. Genitourinary: Negative for dysuria, flank pain and frequency. Musculoskeletal: Negative for arthralgias, joint swelling and myalgias. Skin: Negative for color change and wound. Neurological: Positive for dizziness and weakness. Negative for light-headedness and headaches. Hematological: Negative for adenopathy. Physical Exam     Physical Exam  Vitals and nursing note reviewed. Constitutional:       General: He is not in acute distress. Appearance: He is well-developed. He is not diaphoretic. Comments: Appears   pale and weak.    HENT: Head: Normocephalic and atraumatic. No right periorbital erythema or left periorbital erythema. Jaw: No trismus. Right Ear: External ear normal. No drainage or swelling. Tympanic membrane is not perforated, erythematous or bulging. Left Ear: External ear normal. No drainage or swelling. Tympanic membrane is not perforated, erythematous or bulging. Nose: Nose normal. No mucosal edema or rhinorrhea. Right Sinus: No maxillary sinus tenderness or frontal sinus tenderness. Left Sinus: No maxillary sinus tenderness or frontal sinus tenderness. Mouth/Throat:      Mouth: No oral lesions. Dentition: No dental abscesses. Pharynx: Uvula midline. No oropharyngeal exudate, posterior oropharyngeal erythema or uvula swelling. Tonsils: No tonsillar abscesses. Eyes:      General: No scleral icterus. Right eye: No discharge. Left eye: No discharge. Conjunctiva/sclera: Conjunctivae normal.   Cardiovascular:      Rate and Rhythm: Normal rate and regular rhythm. Heart sounds: Normal heart sounds. No murmur heard. No friction rub. No gallop. Pulmonary:      Effort: Pulmonary effort is normal. No tachypnea, accessory muscle usage or respiratory distress. Breath sounds: Normal breath sounds. No decreased breath sounds, wheezing, rhonchi or rales. Abdominal:      General: Bowel sounds are normal. There is no distension. Palpations: Abdomen is soft. Tenderness: There is abdominal tenderness. Comments: Soft, flat bowel sounds active. Slight tenderness suprapubic area, no fullness, no guarding. Musculoskeletal:         General: No tenderness. Normal range of motion. Cervical back: Normal range of motion and neck supple. Lymphadenopathy:      Cervical: No cervical adenopathy. Skin:     General: Skin is warm and dry. Neurological:      General: No focal deficit present.       Mental Status: He is alert and oriented to person, place, and time. Psychiatric:         Judgment: Judgment normal.         Lab and Diagnostic Study Results     Labs -     Recent Results (from the past 12 hour(s))   METABOLIC PANEL, BASIC    Collection Time: 05/21/22  1:23 PM   Result Value Ref Range    Sodium 132 (L) 136 - 145 mmol/L    Potassium 4.6 3.5 - 5.5 mmol/L    Chloride 98 (L) 100 - 111 mmol/L    CO2 20 (L) 21 - 32 mmol/L    Anion gap 14 3.0 - 18.0 mmol/L    Glucose 75 74 - 99 mg/dL    BUN 67 (H) 7 - 18 mg/dL    Creatinine 4.05 (H) 0.60 - 1.30 mg/dL    BUN/Creatinine ratio 17 12 - 20      GFR est AA 17 (L) >60 ml/min/1.73m2    GFR est non-AA 14 (L) >60 ml/min/1.73m2    Calcium 7.7 (L) 8.5 - 10.1 mg/dL   CBC WITH AUTOMATED DIFF    Collection Time: 05/21/22  1:23 PM   Result Value Ref Range    WBC 27.3 (H) 4.6 - 13.2 K/uL    RBC 2.66 (L) 4.35 - 5.65 M/uL    HGB 6.0 (L) 13.0 - 16.0 g/dL    HCT 20.8 (L) 36.0 - 48.0 %    MCV 78.2 78.0 - 100.0 FL    MCH 22.6 (L) 24.0 - 34.0 PG    MCHC 28.8 (L) 31.0 - 37.0 g/dL    RDW 16.5 (H) 11.6 - 14.5 %    PLATELET 913 (H) 227 - 420 K/uL    MPV 10.8 9.2 - 11.8 FL    NRBC 0.0 0.0  WBC    ABSOLUTE NRBC 0.00 0.00 - 0.01 K/uL    NEUTROPHILS 87 (H) 40 - 73 %    LYMPHOCYTES 2 (L) 21 - 52 %    MONOCYTES 7 3 - 10 %    EOSINOPHILS 0 0 - 5 %    BASOPHILS 0 0 - 2 %    IMMATURE GRANULOCYTES 4 (H) 0 - 0.5 %    ABS. NEUTROPHILS 23.8 (H) 1.8 - 8.0 K/UL    ABS. LYMPHOCYTES 0.5 (L) 0.9 - 3.6 K/UL    ABS. MONOCYTES 1.8 (H) 0.05 - 1.2 K/UL    ABS. EOSINOPHILS 0.0 0.0 - 0.4 K/UL    ABS. BASOPHILS 0.0 0.0 - 0.1 K/UL    ABS. IMM. GRANS. 1.1 (H) 0.00 - 0.04 K/UL    DF AUTOMATED     HEPATIC FUNCTION PANEL    Collection Time: 05/21/22  1:23 PM   Result Value Ref Range    Protein, total 6.9 6.4 - 8.2 g/dL    Albumin 1.8 (L) 3.4 - 5.0 g/dL    Globulin 5.1 (H) 2.0 - 4.0 g/dL    A-G Ratio 0.4 (L) 0.8 - 1.7      Bilirubin, total 0.4 0.2 - 1.0 mg/dL    Bilirubin, direct 0.2 0.0 - 0.2 mg/dL    Alk.  phosphatase 121 (H) 45 - 117 U/L    AST (SGOT) 20 10 - 38 U/L    ALT (SGPT) 30 16 - 61 U/L   TYPE & SCREEN    Collection Time: 05/21/22  1:25 PM   Result Value Ref Range    Crossmatch Expiration 05/24/2022,2359     ABO/Rh(D) O Positive     Antibody screen Negative     Unit number [id number]     Blood component type RC LR,2     Unit division 00     Status of unit Issued     TRANSFUSION STATUS Ok to transfuse     Crossmatch result Compatible    EKG, 12 LEAD, INITIAL    Collection Time: 05/21/22  1:36 PM   Result Value Ref Range    Ventricular Rate 69 BPM    Atrial Rate 69 BPM    P-R Interval 154 ms    QRS Duration 160 ms    Q-T Interval 441 ms    QTC Calculation (Bezet) 473 ms    Calculated P Axis -125 degrees    Calculated R Axis -90 degrees    Calculated T Axis 79 degrees    Diagnosis       Ventricular-paced complexes  No further analysis attempted due to paced rhythm     URINALYSIS W/ RFLX MICROSCOPIC    Collection Time: 05/21/22  2:30 PM   Result Value Ref Range    Color Yellow      Appearance Cloudy      Specific gravity 1.016 1.005 - 1.030      pH (UA) 5.5 5.0 - 8.0      Protein 300 (A) Negative mg/dL    Glucose Negative Negative mg/dL    Ketone Negative Negative mg/dL    Bilirubin Negative Negative      Blood Moderate (A) Negative      Urobilinogen 0.2 0.2 - 1.0 EU/dL    Nitrites Positive (A) Negative      Leukocyte Esterase Moderate (A) Negative     URINE MICROSCOPIC    Collection Time: 05/21/22  2:30 PM   Result Value Ref Range    WBC  0 - 4 /hpf    RBC 0-5 0 - 2 /hpf    Epithelial cells Few 0 - 20 /lpf    Bacteria 2+ (A) None /hpf    Mucus 1+ /lpf    Hyaline cast 0-2 /lpf   CULTURE, BLOOD    Collection Time: 05/21/22  2:40 PM    Specimen: Blood   Result Value Ref Range    Special Requests: No Special Requests      Culture result: No growth after 1 hour     LACTIC ACID    Collection Time: 05/21/22  2:40 PM   Result Value Ref Range    Lactic acid 1.9 0.4 - 2.0 mmol/L   CULTURE, BLOOD    Collection Time: 05/21/22  2:50 PM    Specimen: Blood   Result Value Ref Range    Special Requests: No Special Requests      Culture result: No growth after 1 hour     OCCULT BLOOD, STOOL    Collection Time: 05/21/22  3:30 PM   Result Value Ref Range    Occult Blood,day 1 Positive      Day 1 date: 52,122     LACTIC ACID    Collection Time: 05/21/22  7:15 PM   Result Value Ref Range    Lactic acid HEMOLYSIS INDEX 1 MDS. 0.4 - 2.0 mmol/L       Radiologic Studies -   [unfilled]  CT Results  (Last 48 hours)               05/21/22 1508  CT ABD PELV WO CONT Final result    Impression:      1. Interval developing nonspecific small left-sided pleural effusion, with   associated atelectasis. 2. Satisfactory position of the intervally placed left-sided double-J ureteral   stent with mild to moderate left-sided hydronephrosis. 3. Interval progressive enlarging left periaortic lymph liberty PET avid mass, as   above. 4. No evidence of obstructive bowel pathology. Narrative:  EXAM: CT of the abdomen and pelvis       INDICATION: Abdominal pain       COMPARISON: CT abdomen/pelvis from 1/7/2022, PET/CT from 1/27/2022       TECHNIQUE: Axial CT imaging of the abdomen and pelvis was performed without   intravenous contrast. Multiplanar reformats were generated. One or more dose   reduction techniques were used on this CT: automated exposure control,   adjustment of the mAs and/or kVp according to patient size, and iterative   reconstruction techniques. The specific techniques used on this CT exam have   been documented in the patient's electronic medical record. Digital Imaging and   Communications in Medicine (DICOM) format image data are available to   nonaffiliated external healthcare facilities or entities on a secure, media   free, reciprocally searchable basis with patient authorization for at least a   12-month period after this study.        _______________       FINDINGS:       LOWER CHEST: Small left-sided pleural effusion, superimposed upon emphysematous   changes with mild left-sided atelectasis. Small hiatus hernia is present. LIVER, BILIARY: Liver is unremarkable. No biliary dilation. Minimal layering   cholelithiasis with no acute process       PANCREAS: Unremarkable       SPLEEN: Stable 1.4 cm parenchymal low density in the medial mid spleen (image   36). ADRENALS: Stable bilateral adrenal form thickening. KIDNEYS, URETERS, BLADDER: Mild/moderate left-sided hydronephrosis, with   interval placement of a left-sided double-J ureteral stent, in satisfactory   positioning. Stable CT appearance of the right kidney with renal vascular   atherosclerosis. Diffuse circumferential thickening of the urinary bladder with   left posterior bladder diverticulum, as before. Mild pericystic stranding is   present. GASTROINTESTINAL TRACT: No bowel dilation or wall thickening. LYMPH NODES: Interval progressive enlarged left periaortic lymph liberty mass,   measuring 7.8 x 5.4 x 9 cm, previously 6.6 x 3.8 x 8 cm. There is persistent   mass effect on the adjacent structures to include the left renal vessels, left   pelvic collecting system and adjacent bowel loops. PELVIC ORGANS: Unremarkable. VASCULATURE: Extensive atherosclerotic vascular calcification with stable   infrarenal abdominal aortic aneurysm measuring 3.7 cm. OSSEOUS: No acute or aggressive osseous abnormalities identified. Diffuse   osseous sclerotic changes, bilateral L5 pars defects and stable anterior   spondylolisthesis at L4-S1.       OTHER: None.       _______________               CXR Results  (Last 48 hours)               05/21/22 1517  XR CHEST PORT Final result    Impression:      1. Mild diffuse interstitial prominence, with diagnostic considerations that may   represent reactive/inflammatory small airways process versus early edema. 2. Stable known right lower lobe 2 cm pulmonary nodule.             Narrative:  EXAM: XR CHEST PORT       CLINICAL INDICATION/HISTORY: Fever -Additional: None       COMPARISON: 1/28/2020, 08/10/2019, CT chest from 1/7/2022       TECHNIQUE: Frontal view of the chest       _______________       FINDINGS:       HEART AND MEDIASTINUM: Stable left upper chest cardiac generator and transvenous   leads. Prior median sternotomy CABG. Stable midline cardiac silhouette without   appreciable cardiomegaly. LUNGS AND PLEURAL SPACES: Mild diffuse coarse interstitial markings. Perihilar   right lower lobe 2 cm pulmonary nodule, noting characterized on preceding CT   scan. BONY THORAX AND SOFT TISSUES: No acute or destructive osseous abnormality. _______________                 Medical Decision Making and ED Course   - I am the first and primary provider for this patient AND AM THE PRIMARY PROVIDER OF RECORD. - I reviewed the vital signs, available nursing notes, past medical history, past surgical history, family history and social history. - Initial assessment performed. The patients presenting problems have been discussed, and the staff are in agreement with the care plan formulated and outlined with them. I have encouraged them to ask questions as they arise throughout their visit. Vital Signs-Reviewed the patient's vital signs.     Patient Vitals for the past 12 hrs:   Temp Pulse Resp BP SpO2   05/21/22 2110  63 15 (!) 107/43 95 %   05/21/22 2100 97.5 °F (36.4 °C) 61 18 (!) 103/44 96 %   05/21/22 2030 97.8 °F (36.6 °C) 62 20 (!) 98/43 92 %   05/21/22 2015 97.6 °F (36.4 °C) 62 18 (!) 98/43 93 %   05/21/22 2000 97.4 °F (36.3 °C) 66 19 (!) 107/53 91 %   05/21/22 1945 97.5 °F (36.4 °C) 64 18 (!) 92/32 93 %   05/21/22 1930 97.4 °F (36.3 °C) 62 18 (!) 106/50 92 %   05/21/22 1915 97.4 °F (36.3 °C) 60  (!) 110/47 97 %   05/21/22 1913 97.6 °F (36.4 °C) 63  (!) 92/53 97 %   05/21/22 1825  63  (!) 96/46    05/21/22 1717    (!) 81/33    05/21/22 1558  64  (!) 96/45 96 %   05/21/22 1432  63  (!) 93/45 97 %   05/21/22 1321  72 22 (!) 92/52 99 %       EKG interpretation: (Preliminary): Performed at 13:36, and read at 13:36  Rhythm: paced; and regular . Rate (approx.): 69; Axis: normal; MT interval: normal; QRS interval: normal ; ST/T wave: normal; Other findings: .      Records Reviewed: Nursing Notes, Old Medical Records, Previous Radiology Studies and Previous Laboratory Studies    The patient presents with abdominal pain with a differential diagnosis of appendicitis, biliary colic, cholecystitis, diverticulitis, gastroenteritis, GERD, obstruction, pancreatitis, PUD, renal Colic and UTI    ED Course:   Patient's old medical records, left ureteral stent put in 10 days ago. He has he is being evaluated for metastatic disease. He has some  joycelyn. aortic adenopathy.,  Has a history of chronic kidney disease. ED pale, knees gave a history of fever 2 days ago, patient appeared rather toxic and hypotensive. Evaluation  in ED revealed a white count of 27,000, hemoglobin of 6 and  heme positive stool, increased from baseline of 2 to 4.  urine is infected. Blood and urine cultures were obtained. X-ray showed questionable infiltrates, CT showed periaortic lymph liberty mass with mass effect to surrounding structures. Sepsis protocol was initiated with aggressive hydration and patient got 2 L, acid was however 1.9 but was shocky and neb was less than 65. Also given Zosyn and vancomycin. He has required Levophed is remaining lower after fluids. Was also given 1 unit of packed red blood cells. Pt needs transfer as this facility does not have GI or urology. Discussed with transfer center who was able to put me in touch with Dr. Jam Benz for GI who will consult, also advised talked with urology Dr. Moctezuma Hopes also consult Dr. Son Allenh hospitalist accepted patient to ICU.          Provider Notes (Medical Decision Making):               Consultations:       Consultations: As above    Procedures and Critical Care       Performed by: Luna Morgan MD  PROCEDURES:  Central Line    Date/Time: 5/21/2022 9:20 PM  Performed by: Yomi Daugherty MD  Authorized by: Yomi Daugherty MD     Consent:     Consent obtained:  Verbal    Consent given by:  Patient  Pre-procedure details:     Hand hygiene: Hand hygiene performed prior to insertion      Sterile barrier technique: All elements of maximal sterile technique followed      Skin preparation:  2% chlorhexidine    Skin preparation agent: Skin preparation agent completely dried prior to procedure    Anesthesia (see MAR for exact dosages): Anesthesia method:  Local infiltration    Local anesthetic:  Lidocaine 1% w/o epi  Procedure details:     Location:  R femoral    Patient position:  Trendelenburg    Procedural supplies:  Triple lumen    Catheter size:  7 Fr    Landmarks identified: yes      Ultrasound guidance: no      Number of attempts:  2    Successful placement: yes    Post-procedure details:     Post-procedure:  Dressing applied    Assessment:  Blood return through all ports    Patient tolerance of procedure: Tolerated well, no immediate complications                 CRITICAL CARE NOTE :  7:17 PM  Amount of Critical Care Time: 75(minutes)    IMPENDING DETERIORATION -Metabolic  ASSOCIATED RISK FACTORS - Shock, Bleeding and Metabolic changes  MANAGEMENT- Bedside Assessment  INTERPRETATION -  Xrays, CT Scan and ECG  INTERVENTIONS - hemodynamic mngmt and Metobolic interventions  CASE REVIEW - Hospitalist/Intensivist and Medical Sub-Specialist  TREATMENT RESPONSE -Improved and Stable  PERFORMED BY - Physician    NOTES   :  I have spent critical care time involved in lab review, consultations with specialist, family decision- making, bedside attention and documentation. This time excludes time spent in any separate billed procedures. During this entire length of time I was immediately available to the patient .     Shaila Multani MD        Disposition     Disposition: Transferred to [facility] patient verbally agreed to transfer and understand the risks involved as outlined in the EMTALA form. Diagnosis:   1. Septic shock ([address]    2. Severe anemia    3. Acute renal failure superimposed on chronic kidney disease, unspecified CKD stage, unspecified acute renal failure type ([address]    4. Acute cystitis with hematuria              Diagnosis     Clinical Impression:   1. Septic shock ([address]    2. Severe anemia    3. Acute renal failure superimposed on chronic kidney disease, unspecified CKD stage, unspecified acute renal failure type ([address]    4. Acute cystitis with hematuria        Attestations:    Leopoldo Dixon MD    Please note that this dictation was completed with [email or web address], the computer voice recognition software. Quite often unanticipated grammatical, syntax, homophones, and other interpretive errors are inadvertently transcribed by the computer software. Please disregard these errors. Please excuse any errors that have escaped final proofreading. Thank you.

## 2022-06-01 NOTE — TELEPHONE ENCOUNTER
Last Visit: 2/15/22 with MD Bina Alfaro  Next Appointment: 6/3/22 with MD Bina Alfaro  Previous Refill Encounter(s): 1/16/21 #90 with 1 refill    Requested Prescriptions     Pending Prescriptions Disp Refills    furosemide (LASIX) 20 mg tablet 90 Tablet 1     Sig: Take 1 Tablet by mouth two (2) times a day.        For Luis Graf in place:    Recommendation Provided To:    Intervention Detail: New Rx: 1, reason: Patient Preference   Gap Closed?:    Intervention Accepted By:   [facility] Time Spent (min): 5

## 2022-06-03 NOTE — PROGRESS NOTES
Pt here for hospital f/u. Pt reports SOB and upset stomach. Pt was diagnosed w/ kidney cancer.  Pt will se Dr. Paulie Castro (oncologist) on 6/16/22

## 2022-06-03 NOTE — PROGRESS NOTES
Norman Galvan presents today for   Chief Complaint   Patient presents with   [facility] Follow Up       Is someone accompanying this pt? Lexi Olea     Is the patient using any DME equipment during OV? No     Depression Screening:  3 most recent PHQ Screens 2/16/2022   Little interest or pleasure in doing things Not at all   Feeling down, depressed, irritable, or hopeless Not at all   Total Score PHQ 2 0   Trouble falling or staying asleep, or sleeping too much -   Feeling tired or having little energy -   Poor appetite, weight loss, or overeating -   Feeling bad about yourself - or that you are a failure or have let yourself or your family down -   Trouble concentrating on things such as school, work, reading, or watching TV -   Moving or speaking so slowly that other people could have noticed; or the opposite being so fidgety that others notice -   Thoughts of being better off dead, or hurting yourself in some way -   PHQ 9 Score -   How difficult have these problems made it for you to do your work, take care of your home and get along with others -       Learning Assessment:  Learning Assessment 1/26/2021   PRIMARY LEARNER Patient   HIGHEST LEVEL OF EDUCATION - PRIMARY LEARNER  GRADUATED HIGH SCHOOL OR GED   BARRIERS PRIMARY LEARNER NONE   CO-LEARNER CAREGIVER No   PRIMARY LANGUAGE ENGLISH   LEARNER PREFERENCE PRIMARY DEMONSTRATION   ANSWERED BY Tank Fan    RELATIONSHIP SELF       Fall Risk  Fall Risk Assessment, last 12 mths 6/3/2022   Able to walk? Yes   Fall in past 12 months? 1   Do you feel unsteady? -   Are you worried about falling -   Fall with injury? 0       Health Maintenance reviewed and discussed and ordered per Provider.     Health Maintenance Due   Topic Date Due    COVID-19 Vaccine (1) Never done    Pneumococcal 65+ years (1 - PCV) Never done    Foot Exam Q1  Never done    MICROALBUMIN Q1  Never done    Eye Exam Retinal or Dilated  Never done    DTaP/Tdap/Td series (1 - Tdap) Never done    Shingrix Vaccine Age 50> (1 of 2) Never done    Low dose CT lung screening  Never done   . Coordination of Care:    1. \"Have you been to the ER, urgent care clinic since your last visit? Hospitalized since your last visit? \" Yes Where: [facility]  Reason for visit: Infection     2. \"Have you seen or consulted any other health care providers outside of the [address] [place] [address] since your last visit? \" No     3. For patients aged 39-70: Has the patient had a colonoscopy? NA - based on age     If the patient is female:    4. For patients aged 41-77: Has the patient had a mammogram within the past 2 years? NA - based on age    11. For patients aged 21-65: Has the patient had a pap smear?  NA - based on age

## 2022-06-05 NOTE — PROGRESS NOTES
Subjective: Goldie Mathur is a [de-identified] y.o. male who was seen for Hospital Follow Up    Patient presents today for hospital follow-up. He was admitted to the ICU for septic shock secondary to urosepsis. He had a stent placed for his left-sided hydronephrosis that is caused by an enlarged periaortic lymph node. The lymph node has enlarged compared to previously earlier in the year and does cause mass-effect on the left renal vessels and left side of the colon. Patient was on IV antibiotics and CT-guided biopsy was done for the retroperitoneal mass which was positive for renal cell carcinoma. He currently feels very weak and short of breath. Denies any chest pain. He was discharged on Levaquin and amiodarone was held due to prolonged QTC risk. Rate has been well controlled. He has home health PT and will also follow-up with oncology next week. His pain is not well controlled and has uncontrolled neuropathy as well. Home Medications    Medication Sig Start Date End Date Taking? Authorizing Provider   omeprazole (PRILOSEC) 40 mg capsule Take 40 mg by mouth daily. Yes Provider, Historical   sertraline (ZOLOFT) 50 mg tablet Take 1 Tablet by mouth daily. 6/3/22  Yes Umu Almaraz MD   gabapentin (NEURONTIN) 100 mg capsule Take 1 Capsule by mouth three (3) times daily. Max Daily Amount: 300 mg. 6/3/22  Yes Umu Almaraz MD   glipiZIDE SR (GLUCOTROL XL) 5 mg CR tablet Take 1 Tablet by mouth daily. 6/3/22  Yes Umu Almaraz MD   furosemide (LASIX) 20 mg tablet Take 1 Tablet by mouth two (2) times a day. 6/2/22  Yes Umu Almaraz MD   paricalcitoL [facility] ([facility])) 1 mcg capsule TAKE 1 CAPSULE BY MOUTH DAILY 3/30/22  Yes Isac Ramirez MD   pravastatin (PRAVACHOL) 40 mg tablet TAKE 1 TABLET BY MOUTH DAILY 3/14/22  Yes Umu Almaraz MD   ferrous sulfate (IRON) 325 mg (65 mg iron) EC tablet Take 1 Tablet by mouth three (3) times daily (with meals).  2/15/22  Yes Umu Almaraz MD   tamsulosin (Flomax) 0.4 mg capsule Take 1 Capsule by mouth daily. 2/15/22  Yes Maximiliano Rock MD   Symbicort 160-4.5 mcg/actuation HFAA INL 2 PFS PO TWICE DAILY. RINSE MOUTH AFTER U 12/27/21  Yes Maximiliano Rock MD   Spiriva with HandiHaler 18 mcg inhalation capsule Take 1 Capsule by inhalation daily. 12/27/21  Yes Maximiliano Rock MD   melatonin 10 mg capsule Take 10 mg by mouth nightly. Yes Provider, Historical   dronedarone (Multaq) tab tablet Take 400 mg by mouth two (2) times daily (with meals). Yes Provider, Historical   levoFLOXacin (LEVAQUIN) 750 mg tablet TAKE 1 TABLET BY MOUTH EVERY OTHER DAY FOR 7 DAYS 5/28/22   Provider, Historical   sodium bicarbonate 650 mg tablet Take 1 Tablet by mouth two (2) times a day. Patient not taking: Reported on 6/3/2022 12/28/21   Maximiliano Rock MD   ergocalciferol (ERGOCALCIFEROL) 1,250 mcg (50,000 unit) capsule Take 1 Capsule by mouth every seven (7) days. Patient not taking: Reported on 1/26/2022 12/28/21   Maximiliano Rock MD      No Known Allergies  Social History     Tobacco Use    Smoking status: Current Every Day Smoker     Packs/day: 0.50     Years: 56.00     Pack years: 28.00    Smokeless tobacco: Never Used    Tobacco comment: he smokes a few per day, although he used to smoke one pack a day regularly   Vaping Use    Vaping Use: Never used   Substance Use Topics    Alcohol use: No    Drug use: Not Currently        Review of Systems   All other systems reviewed and are negative.       Objective:     Visit Vitals  /76 (BP 1 Location: Left upper arm, BP Patient Position: Sitting, BP Cuff Size: Adult)   Pulse 70   Temp 96.9 °F (36.1 °C) (Temporal)   Ht 5' 10\" (1.778 m)   Wt 154 lb (69.9 kg)   SpO2 100%   BMI 22.10 kg/m²        General: alert, oriented, not in distress  Chest/Lungs: Decreased breath sounds bilaterally   heart: normal rate, regular rhythm, no murmur  Abdomen: soft, non-distended, non-tender, normal bowel sounds, no organomegaly, no masses  Extremities: no focal deformities, no edema  Skin: no active skin lesions      Assessment & Plan:     1. Hospital discharge follow up  Continue home health PT and follow-up with oncology. Still short of breath and weak    2. Septic shock  Secondary to urosepsis from left-sided hydronephrosis. Continue Levaquin. Follow-up with urology    3. Renal cell carcinoma of left kidney [facility])  Recently diagnosed on CT-guided biopsy. Pain is not well controlled. We will start gabapentin. Follow-up with oncology  - gabapentin (NEURONTIN) 100 mg capsule; Take 1 Capsule by mouth three (3) times daily. Max Daily Amount: 300 mg. Dispense: 90 Capsule; Refill: 0    4. HFrEF (heart failure with reduced ejection fraction) ([facility]  Euvolemic. Echo shows no changes from previous echoes. EF of 50%. Continue Lasix. Check chest x-ray for pleural effusions  - XR CHEST PA LAT; Future  - LIPID PANEL    5. CKD (chronic kidney disease) stage 4, GFR 15-29 ml/min ([facility])  Repeat renal function  - CBC WITH AUTOMATED DIFF; Future  - METABOLIC PANEL, COMPREHENSIVE; Future  - MAGNESIUM; Future  - PHOSPHORUS; Future  - URIC ACID; Future  - MICROALBUMIN, UR, RAND W/ MICROALB/CREAT RATIO; Future  - PTH INTACT; Future  - VITAMIN D, 25 HYDROXY; Future  - HEMOGLOBIN A1C W/O EAG    6. Diabetic polyneuropathy associated with type 2 diabetes mellitus ([facility]  Discontinue NovoLog since A1c is well controlled. Continue glipizide. Start gabapentin for neuropathy  - gabapentin (NEURONTIN) 100 mg capsule; Take 1 Capsule by mouth three (3) times daily. Max Daily Amount: 300 mg. Dispense: 90 Capsule; Refill: 0       45 minutes spent reviewing discharge summary, consult notes, labs, imaging, communicating with nursing staff, patient encounter, charting        I have discussed the diagnosis with the patient and the intended plan as seen in the above orders. The patient has received an after-visit summary and questions were answered concerning future plans.   I have discussed medication side effects and warnings with the patient as well. I have reviewed the plan of care with the patient, accepted their input and they are in agreement with the treatment goals. Previous lab and imaging results were reviewed by me.        Jose Dugan MD  June 5, 2022

## 2022-06-26 NOTE — ED PROVIDER NOTES
The patient is an 68-year-old male with multiple medical problems as listed below, including renal cell carcinoma who is currently on immunotherapy, as well as COPD, who was brought to the ED today by EMS for weakness and not feeling well. His family member states that he has been very sleepy, he has had had abnormal urine, decreased blood sugars, and no appetite. He is followed by Dr. Dinh Keller from [facility]. The patient is unable to provide more of a history at this time.            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 Sabiha Flores at [address] [place] [address]      s/p right knee    HX UROLOGICAL  05/11/2022    CYSTOSCOPY, CYSTOGRAM, BILATERAL RETROGRADE PYELOGRAM, LEFT STENT PLACEMENT ; Dr. Raymundo Kwong         Family History:   Problem Relation Age of Onset    Diabetes Sister     No Known Problems Brother     No Known Problems Brother     Diabetes Sister     Cancer Mother         breast    Cancer Father        Social History     Socioeconomic History    Marital status:      Spouse name: Not on file    Number of children: Not on file    Years of education: Not on file    Highest education level: Not on file   Occupational History    Not on file   Tobacco Use    Smoking status: Current Every Day Smoker     Packs/day: 0.50     Years: 56.00     Pack years: 28.00    Smokeless tobacco: Never Used    Tobacco comment: he smokes a few per day, although he used to smoke one pack a day regularly   Vaping Use    Vaping Use: Never used   Substance and Sexual Activity    Alcohol use: No    Drug use: Not Currently    Sexual activity: Not Currently     Partners: Female   Other Topics Concern    Not on file   Social History Narrative    Not on file     Social Determinants of Health     Financial Resource Strain:     Difficulty of Paying Living Expenses: Not on file   Food Insecurity:     Worried About Running Out of Food in the Last Year: Not on file    Silvia of Food in the Last Year: Not on file   Transportation Needs:     Lack of Transportation (Medical): Not on file    Lack of Transportation (Non-Medical): Not on file   Physical Activity:     Days of Exercise per Week: Not on file    Minutes of Exercise per Session: Not on file   Stress:     Feeling of Stress : Not on file   Social Connections:     Frequency of Communication with Friends and Family: Not on file    Frequency of Social Gatherings with Friends and Family: Not on file    Attends Yarsani Services: Not on file    Active Member of [address] [place] [address] or Organizations: Not on file    Attends Club or Organization Meetings: Not on file    Marital Status: Not on file   Intimate Partner Violence:     Fear of Current or Ex-Partner: Not on file    Emotionally Abused: Not on file    Physically Abused: Not on file    Sexually Abused: Not on file   Housing Stability:     Unable to Pay for Housing in the Last Year: Not on file    Number of [place] in the Last Year: Not on file    Unstable Housing in the Last Year: Not on file         ALLERGIES: Patient has no known allergies.     Review of Systems   Unable to perform ROS: Mental status change       Vitals:    06/26/22 1724   BP: (!) 95/44   Pulse: 62   Resp: 16   Temp: 98.8 °F (37.1 °C)   SpO2: 98%   Weight: 86.2 kg (190 lb)   Height: 5' 10\" (1.778 m)            Physical Exam  Vitals and nursing note reviewed. Constitutional:       Appearance: He is ill-appearing. HENT:      Head: Normocephalic and atraumatic. Right Ear: External ear normal.      Left Ear: External ear normal.      Nose: Nose normal.      Mouth/Throat:      Mouth: Mucous membranes are dry. Eyes:      Extraocular Movements: Extraocular movements intact. Conjunctiva/sclera: Conjunctivae normal.      Pupils: Pupils are equal, round, and reactive to light. Cardiovascular:      Rate and Rhythm: Normal rate and regular rhythm. Pulses: Normal pulses. Heart sounds: Normal heart sounds. Pulmonary:      Breath sounds: Rhonchi present. Comments: Increased resp rate  Abdominal:      General: Abdomen is flat. Bowel sounds are normal.      Palpations: Abdomen is soft. Musculoskeletal:      Cervical back: Normal range of motion and neck supple. Right lower leg: No edema. Left lower leg: No edema. Skin:     General: Skin is warm and dry. Capillary Refill: Capillary refill takes 2 to 3 seconds. Coloration: Skin is pale. Neurological:      Mental Status: He is disoriented. Psychiatric:      Comments: Unable to assess            Recent Results (from the past 12 hour(s))   CBC WITH AUTOMATED DIFF    Collection Time: 06/26/22  5:44 PM   Result Value Ref Range    WBC 13.4 (H) 4.6 - 13.2 K/uL    RBC 2.84 (L) 4.35 - 5.65 M/uL    HGB 6.8 (L) 13.0 - 16.0 g/dL    HCT 23.5 (L) 36.0 - 48.0 %    MCV 82.7 78.0 - 100.0 FL    MCH 23.9 (L) 24.0 - 34.0 PG    MCHC 28.9 (L) 31.0 - 37.0 g/dL    RDW 20.0 (H) 11.6 - 14.5 %    PLATELET 307 267 - 262 K/uL    MPV 10.3 9.2 - 11.8 FL    NRBC 0.0 0.0  WBC    ABSOLUTE NRBC 0.00 0.00 - 0.01 K/uL    NEUTROPHILS 89 (H) 40 - 73 %    LYMPHOCYTES 3 (L) 21 - 52 %    MONOCYTES 7 3 - 10 %    EOSINOPHILS 1 0 - 5 %    BASOPHILS 0 0 - 2 %    IMMATURE GRANULOCYTES 0 %    ABS. NEUTROPHILS 12.0 (H) 1.8 - 8.0 K/UL    ABS. LYMPHOCYTES 0.4 (L) 0.9 - 3.6 K/UL    ABS. MONOCYTES 0.9 0.05 - 1.2 K/UL    ABS. EOSINOPHILS 0.1 0.0 - 0.4 K/UL    ABS. BASOPHILS 0.0 0.0 - 0.1 K/UL    ABS. IMM. GRANS. 0.0 K/UL    DF Manual      RBC COMMENTS Anisocytosis  2+        RBC COMMENTS Macrocytosis  1+        RBC COMMENTS Microcytosis  1+        RBC COMMENTS Hypochromia  1+       METABOLIC PANEL, COMPREHENSIVE    Collection Time: 06/26/22  5:44 PM   Result Value Ref Range    Sodium 135 (L) 136 - 145 mmol/L    Potassium 5.2 3.5 - 5.5 mmol/L    Chloride 106 100 - 111 mmol/L    CO2 20 (L) 21 - 32 mmol/L    Anion gap 9 3.0 - 18.0 mmol/L    Glucose 96 74 - 99 mg/dL    BUN 60 (H) 7 - 18 mg/dL    Creatinine 3.35 (H) 0.60 - 1.30 mg/dL    BUN/Creatinine ratio 18 12 - 20      GFR est AA 22 (L) >60 ml/min/1.73m2    GFR est non-AA 18 (L) >60 ml/min/1.73m2    Calcium 7.4 (L) 8.5 - 10.1 mg/dL    Bilirubin, total 0.3 0.2 - 1.0 mg/dL    AST (SGOT) 19 10 - 38 U/L    ALT (SGPT) 17 16 - 61 U/L    Alk.  phosphatase 103 45 - 117 U/L    Protein, total 6.7 6.4 - 8.2 g/dL    Albumin 1.7 (L) 3.4 - 5.0 g/dL    Globulin 5.0 (H) 2.0 - 4.0 g/dL    A-G Ratio 0.3 (L) 0.8 - 1.7     TROPONIN-HIGH SENSITIVITY    Collection Time: 06/26/22  5:44 PM   Result Value Ref Range    Troponin-High Sensitivity 25 0 - 78 ng/L   URINALYSIS W/ RFLX MICROSCOPIC    Collection Time: 06/26/22  5:48 PM   Result Value Ref Range    Color Yellow      Appearance Turbid      Specific gravity 1.012 1.005 - 1.030      pH (UA) 6.5 5.0 - 8.0      Protein 100 (A) Negative mg/dL    Glucose Negative Negative mg/dL    Ketone Negative Negative mg/dL    Bilirubin Negative Negative      Blood Large (A) Negative      Urobilinogen 1.0 0.2 - 1.0 EU/dL    Nitrites Negative Negative      Leukocyte Esterase Large (A) Negative     URINE MICROSCOPIC    Collection Time: 06/26/22  5:48 PM   Result Value Ref Range    WBC >100 (H) 0 - 4 /hpf    RBC  0 - 2 /hpf    Epithelial cells Few 0 - 20 /lpf    Bacteria 4+ (A) None /hpf       MDM  Number of Diagnoses or Management Options  Acute UTI  Pneumonia due to infectious organism, unspecified laterality, unspecified part of lung  Diagnosis management comments: The patient is an 82-year-old male with past medical history as listed above, who is currently on immunotherapy for RCC, who presents to the ED with increasing weakness and fatigue today. He appears to have a PNA and a UTI with purulent material draining from his downs. He has received Zosyn in the ED along with IVF.   I am turning the patient over to Dr. Lizeth Simons at ED doc shift change pending the rest of his labs and pending transfer to [place] [address] per family request.    ED Course as of 06/30/22 2130   Thu Jun 30, 2022 2119 XR CHEST PORT [EB]      ED Course User Index  [EB] Debby Khan MD       Procedures

## 2022-06-26 NOTE — ED NOTES
Received report from off going shift, pt resting in bed with family at bedside. Downs intact, patent with thick milky fluid in downs bag. Pt repositioned for comfort, vitals obtained.

## 2022-06-27 NOTE — CONSULTS
Hospital Medicine consulted for admission by Dr. Abida Weiss for treatment of PNA and UTI, however patient with renal cell CA on immunotherapy, being treated at [place] [address]. CT from May 2022 shows a left-sided double-J ureteral stent in place with mild to moderate left-sided hydronephrosis. Pt with significant urological surgical history, now with UTI. UA shows 4+ bacteria, leuks and large blood. May need stent exchange if active infection, needs urology consult. No urology services available here. Advised transfer to higher level of care at this time. Thank you for the consultation. Please do not hesitate to reach out should additional needs or concerns arise.      [facility]

## 2022-06-27 NOTE — ED NOTES
ICD-10-CM ICD-9-CM    1. Pneumonia due to infectious organism, unspecified laterality, unspecified part of lung  J18.9 486    2. Acute UTI  N39.0 599.0         ED Course/Medical Decision Makin -patient turned over to me by Dr. Kenneth Valera. This is a renal cell cancer patient receiving immunotherapy with pneumonia and UTI. He will require admission. Jain[facility] [place] hospitalist service- Case discussed. She feels that the patient does not require transfer to [place] [address] and suggest that I consult the hospitalist service here. [place] -hospitalist service at [facility]- she feels that the patient should be transferred to a facility with urology in the setting of UTI and stent. [facility] urology-she we will be happy to consult. Diandra - Comprehensive discussion about pt including labs, radiology, clinical status. The consultant accepts for admission. Disposition:  Transfer      Documentation Review: Old medical records. Nursing notes. Diagnostics:  Labs:    Recent Results (from the past 12 hour(s))   CBC WITH AUTOMATED DIFF    Collection Time: 22  5:44 PM   Result Value Ref Range    WBC 13.4 (H) 4.6 - 13.2 K/uL    RBC 2.84 (L) 4.35 - 5.65 M/uL    HGB 6.8 (L) 13.0 - 16.0 g/dL    HCT 23.5 (L) 36.0 - 48.0 %    MCV 82.7 78.0 - 100.0 FL    MCH 23.9 (L) 24.0 - 34.0 PG    MCHC 28.9 (L) 31.0 - 37.0 g/dL    RDW 20.0 (H) 11.6 - 14.5 %    PLATELET 352 490 - 328 K/uL    MPV 10.3 9.2 - 11.8 FL    NRBC 0.0 0.0  WBC    ABSOLUTE NRBC 0.00 0.00 - 0.01 K/uL    NEUTROPHILS 89 (H) 40 - 73 %    LYMPHOCYTES 3 (L) 21 - 52 %    MONOCYTES 7 3 - 10 %    EOSINOPHILS 1 0 - 5 %    BASOPHILS 0 0 - 2 %    IMMATURE GRANULOCYTES 0 %    ABS. NEUTROPHILS 12.0 (H) 1.8 - 8.0 K/UL    ABS. LYMPHOCYTES 0.4 (L) 0.9 - 3.6 K/UL    ABS. MONOCYTES 0.9 0.05 - 1.2 K/UL    ABS. EOSINOPHILS 0.1 0.0 - 0.4 K/UL    ABS. BASOPHILS 0.0 0.0 - 0.1 K/UL    ABS. IMM.  GRANS. 0.0 K/UL    DF Manual      RBC COMMENTS Anisocytosis  2+        RBC COMMENTS Macrocytosis  1+        RBC COMMENTS Microcytosis  1+        RBC COMMENTS Hypochromia  1+       METABOLIC PANEL, COMPREHENSIVE    Collection Time: 06/26/22  5:44 PM   Result Value Ref Range    Sodium 135 (L) 136 - 145 mmol/L    Potassium 5.2 3.5 - 5.5 mmol/L    Chloride 106 100 - 111 mmol/L    CO2 20 (L) 21 - 32 mmol/L    Anion gap 9 3.0 - 18.0 mmol/L    Glucose 96 74 - 99 mg/dL    BUN 60 (H) 7 - 18 mg/dL    Creatinine 3.35 (H) 0.60 - 1.30 mg/dL    BUN/Creatinine ratio 18 12 - 20      GFR est AA 22 (L) >60 ml/min/1.73m2    GFR est non-AA 18 (L) >60 ml/min/1.73m2    Calcium 7.4 (L) 8.5 - 10.1 mg/dL    Bilirubin, total 0.3 0.2 - 1.0 mg/dL    AST (SGOT) 19 10 - 38 U/L    ALT (SGPT) 17 16 - 61 U/L    Alk.  phosphatase 103 45 - 117 U/L    Protein, total 6.7 6.4 - 8.2 g/dL    Albumin 1.7 (L) 3.4 - 5.0 g/dL    Globulin 5.0 (H) 2.0 - 4.0 g/dL    A-G Ratio 0.3 (L) 0.8 - 1.7     TROPONIN-HIGH SENSITIVITY    Collection Time: 06/26/22  5:44 PM   Result Value Ref Range    Troponin-High Sensitivity 25 0 - 78 ng/L   LACTIC ACID    Collection Time: 06/26/22  5:44 PM   Result Value Ref Range    Lactic acid 1.7 0.4 - 2.0 mmol/L   URINALYSIS W/ RFLX MICROSCOPIC    Collection Time: 06/26/22  5:48 PM   Result Value Ref Range    Color Yellow      Appearance Turbid      Specific gravity 1.012 1.005 - 1.030      pH (UA) 6.5 5.0 - 8.0      Protein 100 (A) Negative mg/dL    Glucose Negative Negative mg/dL    Ketone Negative Negative mg/dL    Bilirubin Negative Negative      Blood Large (A) Negative      Urobilinogen 1.0 0.2 - 1.0 EU/dL    Nitrites Negative Negative      Leukocyte Esterase Large (A) Negative     URINE MICROSCOPIC    Collection Time: 06/26/22  5:48 PM   Result Value Ref Range    WBC >100 (H) 0 - 4 /hpf    RBC  0 - 2 /hpf    Epithelial cells Few 0 - 20 /lpf    Bacteria 4+ (A) None /hpf   COVID-19 RAPID TEST    Collection Time: 06/26/22  9:34 PM   Result Value Ref Range    COVID-19 rapid test Not Detected Not Detected     INFLUENZA A & B AG (RAPID TEST)    Collection Time: 06/26/22  9:34 PM   Result Value Ref Range    Influenza A Antigen Negative Negative      Influenza B Antigen Negative Negative         Recent Results (from the past 24 hour(s))   CBC WITH AUTOMATED DIFF    Collection Time: 06/26/22  5:44 PM   Result Value Ref Range    WBC 13.4 (H) 4.6 - 13.2 K/uL    RBC 2.84 (L) 4.35 - 5.65 M/uL    HGB 6.8 (L) 13.0 - 16.0 g/dL    HCT 23.5 (L) 36.0 - 48.0 %    MCV 82.7 78.0 - 100.0 FL    MCH 23.9 (L) 24.0 - 34.0 PG    MCHC 28.9 (L) 31.0 - 37.0 g/dL    RDW 20.0 (H) 11.6 - 14.5 %    PLATELET 866 128 - 159 K/uL    MPV 10.3 9.2 - 11.8 FL    NRBC 0.0 0.0  WBC    ABSOLUTE NRBC 0.00 0.00 - 0.01 K/uL    NEUTROPHILS 89 (H) 40 - 73 %    LYMPHOCYTES 3 (L) 21 - 52 %    MONOCYTES 7 3 - 10 %    EOSINOPHILS 1 0 - 5 %    BASOPHILS 0 0 - 2 %    IMMATURE GRANULOCYTES 0 %    ABS. NEUTROPHILS 12.0 (H) 1.8 - 8.0 K/UL    ABS. LYMPHOCYTES 0.4 (L) 0.9 - 3.6 K/UL    ABS. MONOCYTES 0.9 0.05 - 1.2 K/UL    ABS. EOSINOPHILS 0.1 0.0 - 0.4 K/UL    ABS. BASOPHILS 0.0 0.0 - 0.1 K/UL    ABS. IMM. GRANS. 0.0 K/UL    DF Manual      RBC COMMENTS Anisocytosis  2+        RBC COMMENTS Macrocytosis  1+        RBC COMMENTS Microcytosis  1+        RBC COMMENTS Hypochromia  1+       METABOLIC PANEL, COMPREHENSIVE    Collection Time: 06/26/22  5:44 PM   Result Value Ref Range    Sodium 135 (L) 136 - 145 mmol/L    Potassium 5.2 3.5 - 5.5 mmol/L    Chloride 106 100 - 111 mmol/L    CO2 20 (L) 21 - 32 mmol/L    Anion gap 9 3.0 - 18.0 mmol/L    Glucose 96 74 - 99 mg/dL    BUN 60 (H) 7 - 18 mg/dL    Creatinine 3.35 (H) 0.60 - 1.30 mg/dL    BUN/Creatinine ratio 18 12 - 20      GFR est AA 22 (L) >60 ml/min/1.73m2    GFR est non-AA 18 (L) >60 ml/min/1.73m2    Calcium 7.4 (L) 8.5 - 10.1 mg/dL    Bilirubin, total 0.3 0.2 - 1.0 mg/dL    AST (SGOT) 19 10 - 38 U/L    ALT (SGPT) 17 16 - 61 U/L    Alk.  phosphatase 103 45 - 117 U/L    Protein, total 6.7 6.4 - 8.2 g/dL    Albumin 1.7 (L) 3.4 - 5.0 g/dL    Globulin 5.0 (H) 2.0 - 4.0 g/dL    A-G Ratio 0.3 (L) 0.8 - 1.7     TROPONIN-HIGH SENSITIVITY    Collection Time: 06/26/22  5:44 PM   Result Value Ref Range    Troponin-High Sensitivity 25 0 - 78 ng/L   LACTIC ACID    Collection Time: 06/26/22  5:44 PM   Result Value Ref Range    Lactic acid 1.7 0.4 - 2.0 mmol/L   URINALYSIS W/ RFLX MICROSCOPIC    Collection Time: 06/26/22  5:48 PM   Result Value Ref Range    Color Yellow      Appearance Turbid      Specific gravity 1.012 1.005 - 1.030      pH (UA) 6.5 5.0 - 8.0      Protein 100 (A) Negative mg/dL    Glucose Negative Negative mg/dL    Ketone Negative Negative mg/dL    Bilirubin Negative Negative      Blood Large (A) Negative      Urobilinogen 1.0 0.2 - 1.0 EU/dL    Nitrites Negative Negative      Leukocyte Esterase Large (A) Negative     URINE MICROSCOPIC    Collection Time: 06/26/22  5:48 PM   Result Value Ref Range    WBC >100 (H) 0 - 4 /hpf    RBC  0 - 2 /hpf    Epithelial cells Few 0 - 20 /lpf    Bacteria 4+ (A) None /hpf   COVID-19 RAPID TEST    Collection Time: 06/26/22  9:34 PM   Result Value Ref Range    COVID-19 rapid test Not Detected Not Detected     INFLUENZA A & B AG (RAPID TEST)    Collection Time: 06/26/22  9:34 PM   Result Value Ref Range    Influenza A Antigen Negative Negative      Influenza B Antigen Negative Negative           RADIOLOGY:  XR CHEST PORT   Final Result      1. Left basilar opacity, concerning for pneumonia. 2. Known pulmonary nodules are not well-demonstrated.